# Patient Record
Sex: MALE | Race: WHITE | NOT HISPANIC OR LATINO | Employment: OTHER | URBAN - METROPOLITAN AREA
[De-identification: names, ages, dates, MRNs, and addresses within clinical notes are randomized per-mention and may not be internally consistent; named-entity substitution may affect disease eponyms.]

---

## 2021-06-30 ENCOUNTER — HISTORICAL (OUTPATIENT)
Dept: RADIOLOGY | Facility: HOSPITAL | Age: 71
End: 2021-06-30

## 2021-11-08 ENCOUNTER — HISTORICAL (OUTPATIENT)
Dept: CARDIOLOGY | Facility: HOSPITAL | Age: 71
End: 2021-11-08

## 2021-11-08 LAB
ABS NEUT (OLG): 6.11 X10(3)/MCL (ref 2.1–9.2)
BASOPHILS # BLD AUTO: 0 X10(3)/MCL (ref 0–0.2)
BASOPHILS NFR BLD AUTO: 0 %
BUN SERPL-MCNC: 31.6 MG/DL (ref 8.4–25.7)
CALCIUM SERPL-MCNC: 9.9 MG/DL (ref 8.7–10.5)
CHLORIDE SERPL-SCNC: 103 MMOL/L (ref 98–107)
CO2 SERPL-SCNC: 20 MMOL/L (ref 23–31)
CREAT SERPL-MCNC: 1.14 MG/DL (ref 0.73–1.18)
CREAT/UREA NIT SERPL: 28
ERYTHROCYTE [DISTWIDTH] IN BLOOD BY AUTOMATED COUNT: 12.7 % (ref 11.5–17)
GLUCOSE SERPL-MCNC: 312 MG/DL (ref 82–115)
HCT VFR BLD AUTO: 39.5 % (ref 42–52)
HGB BLD-MCNC: 13.7 GM/DL (ref 14–18)
LYMPHOCYTES # BLD AUTO: 1 X10(3)/MCL (ref 0.6–4.6)
LYMPHOCYTES NFR BLD AUTO: 14 %
MCH RBC QN AUTO: 31.6 PG (ref 27–31)
MCHC RBC AUTO-ENTMCNC: 34.7 GM/DL (ref 33–36)
MCV RBC AUTO: 91.2 FL (ref 80–94)
MONOCYTES # BLD AUTO: 0.2 X10(3)/MCL (ref 0.1–1.3)
MONOCYTES NFR BLD AUTO: 3 %
NEUTROPHILS # BLD AUTO: 6.11 X10(3)/MCL (ref 2.1–9.2)
NEUTROPHILS NFR BLD AUTO: 82 %
PLATELET # BLD AUTO: 258 X10(3)/MCL (ref 130–400)
PMV BLD AUTO: 9.6 FL (ref 9.4–12.4)
POTASSIUM SERPL-SCNC: 4.9 MMOL/L (ref 3.5–5.1)
RBC # BLD AUTO: 4.33 X10(6)/MCL (ref 4.7–6.1)
SODIUM SERPL-SCNC: 134 MMOL/L (ref 136–145)
WBC # SPEC AUTO: 7.4 X10(3)/MCL (ref 4.5–11.5)

## 2021-11-09 ENCOUNTER — HISTORICAL (OUTPATIENT)
Dept: RESPIRATORY THERAPY | Facility: HOSPITAL | Age: 71
End: 2021-11-09

## 2021-11-11 ENCOUNTER — HISTORICAL (OUTPATIENT)
Dept: LAB | Facility: HOSPITAL | Age: 71
End: 2021-11-11

## 2021-11-11 LAB
BUN SERPL-MCNC: 26.2 MG/DL (ref 8.4–25.7)
CALCIUM SERPL-MCNC: 10.5 MG/DL (ref 8.8–10)
CHLORIDE SERPL-SCNC: 101 MMOL/L (ref 98–107)
CO2 SERPL-SCNC: 22 MMOL/L (ref 23–31)
CREAT SERPL-MCNC: 1.11 MG/DL (ref 0.72–1.25)
CREAT/UREA NIT SERPL: 24
GLUCOSE SERPL-MCNC: 245 MG/DL (ref 82–115)
POTASSIUM SERPL-SCNC: 4.9 MMOL/L (ref 3.5–5.1)
SODIUM SERPL-SCNC: 134 MMOL/L (ref 136–145)

## 2021-11-12 ENCOUNTER — HISTORICAL (OUTPATIENT)
Dept: ADMINISTRATIVE | Facility: HOSPITAL | Age: 71
End: 2021-11-12

## 2021-12-03 ENCOUNTER — HISTORICAL (OUTPATIENT)
Dept: ADMINISTRATIVE | Facility: HOSPITAL | Age: 71
End: 2021-12-03

## 2021-12-03 LAB
BUN SERPL-MCNC: 20.1 MG/DL (ref 8.4–25.7)
CALCIUM SERPL-MCNC: 9.9 MG/DL (ref 8.7–10.5)
CHLORIDE SERPL-SCNC: 102 MMOL/L (ref 98–107)
CO2 SERPL-SCNC: 22 MMOL/L (ref 23–31)
CREAT SERPL-MCNC: 0.88 MG/DL (ref 0.73–1.18)
CREAT/UREA NIT SERPL: 23
GLUCOSE SERPL-MCNC: 207 MG/DL (ref 82–115)
POTASSIUM SERPL-SCNC: 4.6 MMOL/L (ref 3.5–5.1)
SODIUM SERPL-SCNC: 137 MMOL/L (ref 136–145)

## 2021-12-06 ENCOUNTER — HISTORICAL (OUTPATIENT)
Dept: ADMINISTRATIVE | Facility: HOSPITAL | Age: 71
End: 2021-12-06

## 2021-12-06 LAB
ABS NEUT (OLG): 4.12 X10(3)/MCL (ref 2.1–9.2)
ALBUMIN SERPL-MCNC: 4.1 GM/DL (ref 3.4–4.8)
ALBUMIN/GLOB SERPL: 1.4 RATIO (ref 1.1–2)
ALP SERPL-CCNC: 62 UNIT/L (ref 40–150)
ALT SERPL-CCNC: 46 UNIT/L (ref 0–55)
AST SERPL-CCNC: 39 UNIT/L (ref 5–34)
BASOPHILS # BLD AUTO: 0 X10(3)/MCL (ref 0–0.2)
BASOPHILS NFR BLD AUTO: 1 %
BILIRUB SERPL-MCNC: 1 MG/DL
BILIRUBIN DIRECT+TOT PNL SERPL-MCNC: 0.3 MG/DL (ref 0–0.5)
BILIRUBIN DIRECT+TOT PNL SERPL-MCNC: 0.7 MG/DL (ref 0–0.8)
BNP BLD-MCNC: 13.7 PG/ML
BUN SERPL-MCNC: 26.2 MG/DL (ref 8.4–25.7)
CALCIUM SERPL-MCNC: 9.8 MG/DL (ref 8.7–10.5)
CHLORIDE SERPL-SCNC: 100 MMOL/L (ref 98–107)
CO2 SERPL-SCNC: 24 MMOL/L (ref 23–31)
CREAT SERPL-MCNC: 1.01 MG/DL (ref 0.73–1.18)
EOSINOPHIL # BLD AUTO: 0.2 X10(3)/MCL (ref 0–0.9)
EOSINOPHIL NFR BLD AUTO: 2 %
ERYTHROCYTE [DISTWIDTH] IN BLOOD BY AUTOMATED COUNT: 13.2 % (ref 11.5–17)
GLOBULIN SER-MCNC: 3 GM/DL (ref 2.4–3.5)
GLUCOSE SERPL-MCNC: 187 MG/DL (ref 82–115)
GROUP & RH: NORMAL
HCT VFR BLD AUTO: 40.6 % (ref 42–52)
HGB BLD-MCNC: 13.6 GM/DL (ref 14–18)
INR PPP: 1 (ref 0–1.3)
LYMPHOCYTES # BLD AUTO: 2.3 X10(3)/MCL (ref 0.6–4.6)
LYMPHOCYTES NFR BLD AUTO: 32 %
MCH RBC QN AUTO: 31.4 PG (ref 27–31)
MCHC RBC AUTO-ENTMCNC: 33.5 GM/DL (ref 33–36)
MCV RBC AUTO: 93.8 FL (ref 80–94)
MONOCYTES # BLD AUTO: 0.6 X10(3)/MCL (ref 0.1–1.3)
MONOCYTES NFR BLD AUTO: 8 %
NEUTROPHILS # BLD AUTO: 4.12 X10(3)/MCL (ref 2.1–9.2)
NEUTROPHILS NFR BLD AUTO: 57 %
PLATELET # BLD AUTO: 258 X10(3)/MCL (ref 130–400)
PMV BLD AUTO: 10.3 FL (ref 9.4–12.4)
POTASSIUM SERPL-SCNC: 4.5 MMOL/L (ref 3.5–5.1)
PROT SERPL-MCNC: 7.1 GM/DL (ref 5.8–7.6)
PROTHROMBIN TIME: 13.4 SECOND(S) (ref 12.5–14.5)
RBC # BLD AUTO: 4.33 X10(6)/MCL (ref 4.7–6.1)
SODIUM SERPL-SCNC: 135 MMOL/L (ref 136–145)
WBC # SPEC AUTO: 7.2 X10(3)/MCL (ref 4.5–11.5)

## 2021-12-08 LAB — FINAL CULTURE: NORMAL

## 2022-04-10 ENCOUNTER — HISTORICAL (OUTPATIENT)
Dept: ADMINISTRATIVE | Facility: HOSPITAL | Age: 72
End: 2022-04-10

## 2022-04-26 VITALS
SYSTOLIC BLOOD PRESSURE: 121 MMHG | DIASTOLIC BLOOD PRESSURE: 65 MMHG | BODY MASS INDEX: 38.54 KG/M2 | HEIGHT: 67 IN | WEIGHT: 245.56 LBS

## 2023-08-25 ENCOUNTER — HOSPITAL ENCOUNTER (INPATIENT)
Facility: HOSPITAL | Age: 73
LOS: 3 days | Discharge: HOME | DRG: 244 | End: 2023-08-28
Attending: EMERGENCY MEDICINE | Admitting: INTERNAL MEDICINE
Payer: MEDICARE

## 2023-08-25 DIAGNOSIS — I44.2 COMPLETE HEART BLOCK: ICD-10-CM

## 2023-08-25 DIAGNOSIS — R00.1 BRADYCARDIA: ICD-10-CM

## 2023-08-25 DIAGNOSIS — I49.9 ARRHYTHMIA: ICD-10-CM

## 2023-08-25 DIAGNOSIS — I45.9 HEART BLOCK: ICD-10-CM

## 2023-08-25 DIAGNOSIS — R06.02 SOB (SHORTNESS OF BREATH): ICD-10-CM

## 2023-08-25 LAB
ALBUMIN SERPL-MCNC: 4 G/DL (ref 3.4–4.8)
ALBUMIN/GLOB SERPL: 1.1 RATIO (ref 1.1–2)
ALP SERPL-CCNC: 53 UNIT/L (ref 40–150)
ALT SERPL-CCNC: 20 UNIT/L (ref 0–55)
APTT PPP: 26.2 SECONDS (ref 23.2–33.7)
AST SERPL-CCNC: 19 UNIT/L (ref 5–34)
AV INDEX (PROSTH): 0.49
AV MEAN GRADIENT: 9 MMHG
AV PEAK GRADIENT: 19 MMHG
AV VALVE AREA BY VELOCITY RATIO: 1.34 CM²
AV VALVE AREA: 1.4 CM²
AV VELOCITY RATIO: 0.47
BASOPHILS # BLD AUTO: 0.05 X10(3)/MCL
BASOPHILS NFR BLD AUTO: 0.5 %
BILIRUB SERPL-MCNC: 1.1 MG/DL
BNP BLD-MCNC: 211.3 PG/ML
BSA FOR ECHO PROCEDURE: 2.27 M2
BUN SERPL-MCNC: 25.8 MG/DL (ref 8.4–25.7)
CALCIUM SERPL-MCNC: 10.6 MG/DL (ref 8.8–10)
CHLORIDE SERPL-SCNC: 105 MMOL/L (ref 98–107)
CO2 SERPL-SCNC: 20 MMOL/L (ref 23–31)
CREAT SERPL-MCNC: 1.19 MG/DL (ref 0.73–1.18)
CV ECHO LV RWT: 0.49 CM
DOP CALC AO PEAK VEL: 2.18 M/S
DOP CALC AO VTI: 43.9 CM
DOP CALC LVOT AREA: 2.8 CM2
DOP CALC LVOT DIAMETER: 1.9 CM
DOP CALC LVOT PEAK VEL: 1.03 M/S
DOP CALC LVOT STROKE VOLUME: 61.49 CM3
DOP CALC MV VTI: 73.9 CM
DOP CALCLVOT PEAK VEL VTI: 21.7 CM
E WAVE DECELERATION TIME: 306 MSEC
E/A RATIO: 4.48
E/E' RATIO: 25.57 M/S
ECHO LV POSTERIOR WALL: 1.2 CM (ref 0.6–1.1)
EOSINOPHIL # BLD AUTO: 0.16 X10(3)/MCL (ref 0–0.9)
EOSINOPHIL NFR BLD AUTO: 1.7 %
ERYTHROCYTE [DISTWIDTH] IN BLOOD BY AUTOMATED COUNT: 13.5 % (ref 11.5–17)
FRACTIONAL SHORTENING: 39 % (ref 28–44)
GFR SERPLBLD CREATININE-BSD FMLA CKD-EPI: >60 MLS/MIN/1.73/M2
GLOBULIN SER-MCNC: 3.5 GM/DL (ref 2.4–3.5)
GLUCOSE SERPL-MCNC: 104 MG/DL (ref 82–115)
HCT VFR BLD AUTO: 32.4 % (ref 42–52)
HGB BLD-MCNC: 11.7 G/DL (ref 14–18)
IMM GRANULOCYTES # BLD AUTO: 0.03 X10(3)/MCL (ref 0–0.04)
IMM GRANULOCYTES NFR BLD AUTO: 0.3 %
INR PPP: 1.1
INTERVENTRICULAR SEPTUM: 1.26 CM (ref 0.6–1.1)
LEFT ATRIUM SIZE: 4 CM
LEFT ATRIUM VOLUME INDEX MOD: 25.3 ML/M2
LEFT ATRIUM VOLUME MOD: 55.3 CM3
LEFT INTERNAL DIMENSION IN SYSTOLE: 2.99 CM (ref 2.1–4)
LEFT VENTRICLE DIASTOLIC VOLUME INDEX: 50.68 ML/M2
LEFT VENTRICLE DIASTOLIC VOLUME: 111 ML
LEFT VENTRICLE MASS INDEX: 106 G/M2
LEFT VENTRICLE SYSTOLIC VOLUME INDEX: 15.8 ML/M2
LEFT VENTRICLE SYSTOLIC VOLUME: 34.7 ML
LEFT VENTRICULAR INTERNAL DIMENSION IN DIASTOLE: 4.87 CM (ref 3.5–6)
LEFT VENTRICULAR MASS: 232.17 G
LV LATERAL E/E' RATIO: 29.83 M/S
LV SEPTAL E/E' RATIO: 22.38 M/S
LVOT MG: 2 MMHG
LVOT MV: 0.64 CM/S
LYMPHOCYTES # BLD AUTO: 2.7 X10(3)/MCL (ref 0.6–4.6)
LYMPHOCYTES NFR BLD AUTO: 29.2 %
MAGNESIUM SERPL-MCNC: 1.9 MG/DL (ref 1.6–2.6)
MCH RBC QN AUTO: 32 PG (ref 27–31)
MCHC RBC AUTO-ENTMCNC: 36.1 G/DL (ref 33–36)
MCV RBC AUTO: 88.5 FL (ref 80–94)
MONOCYTES # BLD AUTO: 0.83 X10(3)/MCL (ref 0.1–1.3)
MONOCYTES NFR BLD AUTO: 9 %
MV MEAN GRADIENT: 4 MMHG
MV PEAK A VEL: 0.4 M/S
MV PEAK E VEL: 1.79 M/S
MV PEAK GRADIENT: 20 MMHG
MV STENOSIS PRESSURE HALF TIME: 69 MS
MV VALVE AREA BY CONTINUITY EQUATION: 0.83 CM2
MV VALVE AREA P 1/2 METHOD: 3.19 CM2
NEUTROPHILS # BLD AUTO: 5.48 X10(3)/MCL (ref 2.1–9.2)
NEUTROPHILS NFR BLD AUTO: 59.3 %
NRBC BLD AUTO-RTO: 0 %
OHS LV EJECTION FRACTION SIMPSONS BIPLANE MOD: 68 %
PISA TR MAX VEL: 2.48 M/S
PLATELET # BLD AUTO: 211 X10(3)/MCL (ref 130–400)
PMV BLD AUTO: 9.9 FL (ref 7.4–10.4)
POCT GLUCOSE: 73 MG/DL (ref 70–110)
POCT GLUCOSE: 93 MG/DL (ref 70–110)
POTASSIUM SERPL-SCNC: 4.6 MMOL/L (ref 3.5–5.1)
PROT SERPL-MCNC: 7.5 GM/DL (ref 5.8–7.6)
PROTHROMBIN TIME: 13.9 SECONDS (ref 12.5–14.5)
PV PEAK GRADIENT: 5 MMHG
PV PEAK VELOCITY: 1.07 M/S
RA PRESSURE ESTIMATED: 8 MMHG
RBC # BLD AUTO: 3.66 X10(6)/MCL (ref 4.7–6.1)
RV TB RVSP: 10 MMHG
SODIUM SERPL-SCNC: 136 MMOL/L (ref 136–145)
TDI LATERAL: 0.06 M/S
TDI SEPTAL: 0.08 M/S
TDI: 0.07 M/S
TR MAX PG: 25 MMHG
TRICUSPID ANNULAR PLANE SYSTOLIC EXCURSION: 2.44 CM
TROPONIN I SERPL-MCNC: <0.01 NG/ML (ref 0–0.04)
TV REST PULMONARY ARTERY PRESSURE: 33 MMHG
WBC # SPEC AUTO: 9.25 X10(3)/MCL (ref 4.5–11.5)
Z-SCORE OF LEFT VENTRICULAR DIMENSION IN END DIASTOLE: -4.18
Z-SCORE OF LEFT VENTRICULAR DIMENSION IN END SYSTOLE: -3.23

## 2023-08-25 PROCEDURE — 93010 ELECTROCARDIOGRAM REPORT: CPT | Mod: ,,, | Performed by: INTERNAL MEDICINE

## 2023-08-25 PROCEDURE — 25000003 PHARM REV CODE 250

## 2023-08-25 PROCEDURE — 83735 ASSAY OF MAGNESIUM: CPT | Performed by: EMERGENCY MEDICINE

## 2023-08-25 PROCEDURE — 83880 ASSAY OF NATRIURETIC PEPTIDE: CPT | Performed by: NURSE PRACTITIONER

## 2023-08-25 PROCEDURE — 11000001 HC ACUTE MED/SURG PRIVATE ROOM

## 2023-08-25 PROCEDURE — 85610 PROTHROMBIN TIME: CPT | Performed by: EMERGENCY MEDICINE

## 2023-08-25 PROCEDURE — 85730 THROMBOPLASTIN TIME PARTIAL: CPT | Performed by: EMERGENCY MEDICINE

## 2023-08-25 PROCEDURE — 99285 EMERGENCY DEPT VISIT HI MDM: CPT | Mod: 25

## 2023-08-25 PROCEDURE — 84484 ASSAY OF TROPONIN QUANT: CPT | Performed by: NURSE PRACTITIONER

## 2023-08-25 PROCEDURE — 85025 COMPLETE CBC W/AUTO DIFF WBC: CPT | Performed by: NURSE PRACTITIONER

## 2023-08-25 PROCEDURE — 80053 COMPREHEN METABOLIC PANEL: CPT | Performed by: NURSE PRACTITIONER

## 2023-08-25 PROCEDURE — 93010 EKG 12-LEAD: ICD-10-PCS | Mod: ,,, | Performed by: INTERNAL MEDICINE

## 2023-08-25 PROCEDURE — 93005 ELECTROCARDIOGRAM TRACING: CPT

## 2023-08-25 PROCEDURE — 82962 GLUCOSE BLOOD TEST: CPT

## 2023-08-25 PROCEDURE — 21400001 HC TELEMETRY ROOM

## 2023-08-25 RX ORDER — GLIPIZIDE 10 MG/1
10 TABLET ORAL
COMMUNITY

## 2023-08-25 RX ORDER — LISINOPRIL 20 MG/1
20 TABLET ORAL DAILY
COMMUNITY

## 2023-08-25 RX ORDER — LISINOPRIL AND HYDROCHLOROTHIAZIDE 12.5; 2 MG/1; MG/1
1 TABLET ORAL DAILY
COMMUNITY

## 2023-08-25 RX ORDER — ROSUVASTATIN CALCIUM 40 MG/1
10 TABLET, COATED ORAL NIGHTLY
COMMUNITY

## 2023-08-25 RX ORDER — METFORMIN HYDROCHLORIDE 500 MG/1
1000 TABLET ORAL 2 TIMES DAILY WITH MEALS
Status: DISCONTINUED | OUTPATIENT
Start: 2023-08-25 | End: 2023-08-28 | Stop reason: HOSPADM

## 2023-08-25 RX ORDER — ATROPINE SULFATE 0.1 MG/ML
0.5 INJECTION INTRAVENOUS ONCE AS NEEDED
Status: DISCONTINUED | OUTPATIENT
Start: 2023-08-25 | End: 2023-08-28 | Stop reason: HOSPADM

## 2023-08-25 RX ORDER — ASPIRIN 81 MG/1
81 TABLET ORAL DAILY
Status: DISCONTINUED | OUTPATIENT
Start: 2023-08-26 | End: 2023-08-28 | Stop reason: HOSPADM

## 2023-08-25 RX ORDER — ALBUTEROL SULFATE 90 UG/1
2 AEROSOL, METERED RESPIRATORY (INHALATION) EVERY 6 HOURS PRN
COMMUNITY

## 2023-08-25 RX ORDER — METFORMIN HYDROCHLORIDE 1000 MG/1
1000 TABLET ORAL 2 TIMES DAILY WITH MEALS
COMMUNITY

## 2023-08-25 RX ORDER — ATORVASTATIN CALCIUM 40 MG/1
40 TABLET, FILM COATED ORAL DAILY
Status: DISCONTINUED | OUTPATIENT
Start: 2023-08-26 | End: 2023-08-28 | Stop reason: HOSPADM

## 2023-08-25 RX ORDER — GLIPIZIDE 10 MG/1
10 TABLET ORAL 2 TIMES DAILY WITH MEALS
Status: DISCONTINUED | OUTPATIENT
Start: 2023-08-25 | End: 2023-08-28 | Stop reason: HOSPADM

## 2023-08-25 RX ADMIN — METFORMIN HYDROCHLORIDE 1000 MG: 500 TABLET, FILM COATED ORAL at 07:08

## 2023-08-25 RX ADMIN — GLIPIZIDE 10 MG: 10 TABLET ORAL at 07:08

## 2023-08-25 NOTE — Clinical Note
The lead was inserted under fluorscopic guidance, thresholds were tested, was sutured in place and was secured in place. A new lead was attached to the right atrium.

## 2023-08-25 NOTE — Clinical Note
The lead was inserted under fluorscopic guidance, thresholds were tested, was sutured in place and was secured in place. A new lead was attached to the right ventricle.

## 2023-08-25 NOTE — PLAN OF CARE
Pt is , 2 children, no living will or POA.   08/25/23 1505   Discharge Assessment   Assessment Type Discharge Planning Assessment   Confirmed/corrected address, phone number and insurance Yes   Confirmed Demographics Correct on Facesheet   Source of Information patient   When was your last doctors appointment?   (Montserrat Taylor PCP at [facility]- last appt today)   Communicated LV with patient/caregiver Date not available/Unable to determine   People in Home spouse   Do you expect to return to your current living situation? Yes   Do you have help at home or someone to help you manage your care at home? Yes   Who are your caregiver(s) and their phone number(s)? wife Andra [phone]   Prior to hospitilization cognitive status: Unable to Assess   Current cognitive status: Alert/Oriented   Walking or Climbing Stairs   (none)   Dressing/Bathing   (none)   Home Accessibility wheelchair accessible   Home Layout Able to live on 1st floor   Equipment Currently Used at Home blood pressure machine;glucometer   Readmission within 30 days? No   Patient currently being followed by outpatient case management? No   Do you currently have service(s) that help you manage your care at home? No   Do you take prescription medications? Yes  (Fills with [facility])   Do you have prescription coverage? Yes   Coverage [employer]   Do you have any problems affording any of your prescribed medications? No   Is the patient taking medications as prescribed? yes   Who is going to help you get home at discharge? brother in law   How do you get to doctors appointments? family or friend will provide;car, drives self   Are you on dialysis? No   Do you take coumadin? No   DME Needed Upon Discharge  other (see comments)  (TBD)   Discharge Plan discussed with: Patient   Transition of Care Barriers None   Discharge Plan A Other  (TBD)

## 2023-08-25 NOTE — FIRST PROVIDER EVALUATION
Medical screening examination initiated.  I have conducted a focused provider triage encounter, findings are as follows:    Brief history of present illness:  Patient states that he went to his PCP's office this morning for SOB. States that he was told that his heart rate was low and to report to the ED.     There were no vitals filed for this visit.    Pertinent physical exam:  Awake, alert, ambulatory      Brief workup plan:  Labs, EKG, Imaging    Preliminary workup initiated; this workup will be continued and followed by the physician or advanced practice provider that is assigned to the patient when roomed.

## 2023-08-25 NOTE — H&P
[facility] - Emergency Dept    Cardiology  History and Physical     Patient Name: Kendall Conroy Sr.  MRN: [id number]  Admission Date: 8/25/2023  Code Status: No Order   Attending Provider: Oswald Christine MD   Primary Care Physician: Montserrat Taylor FNP  Principal Problem:<principal problem not specified>    Patient information was obtained from patient, past medical records, and ER records.     Subjective:     Chief Complaint:  Shortness of Breath    HPI:  Mr. Conroy is a 71 y/o male with a history of Bradycardia, AS s/p TAVR, Bilateral Carotid Artery Disease, Native CAD, DM II, HTN, who is known to [facility], Dr. Vallejo. He presented to the ER on 8.25.23 with complaints of SOB. He reports he went to his PCP office for the SOB and they reported his heart rate was low. He was then instructed to report to the ER. Upon Arrival to the ER, he was found to be bradycardic with a heart rate in the 40s. EKG revealed Complete Heart Block. Significant labs include H&H 11.7/32.4, BUN/Creat 25.8/1.119, Calcium 10.6, .3. CXR unremarkable. [facility] will admit for Complete Heart Block Management.    PMH: Bradycardia, VHD, Bilateral Carotid Artery Disease, Native CAD, DM II, HTN, Hypersomia, Snoring  PSH: TAVR, Carpal Tunnel Repair, Neck Surgery, Back Surgery  Family History: Father - HTN; Mother - Encephalitis  Social History: Former Smoker (2003). Denies illicit drug use and alcohol use.     Previous Cardiac Diagnostics:   ECHO (8.25.23):   Left Ventricle: Mildly increased wall thickness. Normal wall motion. There is normal systolic function with a visually estimated ejection fraction of 65 - 70%. Right Ventricle: Normal right ventricular cavity size. Systolic function is normal. Aortic Valve: The aortic valve is a trileaflet valve. There is mild stenosis. Aortic valve area by VTI is 1.40 cm². Aortic valve peak velocity is 2.18 m/s. Mean gradient is 9 mmHg. The dimensionless index is 0.49. There is no  significant regurgitation.  Mitral Valve: Mildly thickened leaflets. Mildly calcified leaflets. There is no stenosis. The mean pressure gradient across the mitral valve is 4 mmHg at a heart rate of  bpm. There is mild regurgitation. Tricuspid Valve: There is mild regurgitation. IVC/SVC: Intermediate venous pressure at 8 mmHg.    ETT (3.30.23):  Stress EKG is abnormal. Nugent treadmill score is -2, this is suggestive of moderate risk treadmill test. Horizontal ST depression is noted in the Lead 2, Lead 3, AVF, V3, V4, V5 and V6. Maximal exercise treadmill test (MPHR : 86 %).The functional capacity is fair (5.0 METs).The heart rate recovery is normal. The study quality is good.     Holter Monitor (3.17.23):  This is an average quality study. Predominant rhythm is sinus bradycardia. The minimum heart rate recorded was 38 beats / minute (3/17/2023). The maximum heart rate is 96 beats / minute (3/17/2023). The mean heart rate is 43 beats / minute. Second degree AV block noted. 1st degree AV block with rare second degree Type 1 AV block during awake hours. Rare premature ventricular contractions noted.     Carotid US (11.30.22):  The study quality is average. 1-39% stenosis in the proximal right internal carotid artery based on Bluth Criteria. The right vertebral artery is poorly visualized. 1-39% stenosis in the proximal left internal carotid artery based on Bluth Criteria. Antegrade left vertebral artery flow.     TTE (11.30.22):  The study quality is average. The left ventricle is normal in size. Global left ventricular systolic function is normal. The left ventricular ejection fraction is 60%. Left ventricular diastolic function is normal. A prosthetic valve is noted in the aortic position it is a known TAVR 29mm S3 valve date of implant 17/7/2021. The trans-aortic mean gradient is 9.9 mmHg.    TAVR (12.7.23):  Successful transcatheter valve replacement 29 mm S3 valve    LHC (11.8.21):  Left main coronary artery: Short.  Patent.  Left anterior descending artery: Luminal irregularities  Left circumflex: Dominant. Luminal regularities.  Right coronary artery: Unable to cannulate.    MPI (11.22.19):  This is an abnormal perfusion study. Study is consistent with ischemia.This scan is suggestive of low to moderate risk for future cardiovascular events. Small reversible perfusion abnormality of mild intensity in the anterior lateral region. The left ventricular cavity is noted to be normal on the stress study. The left ventricular ejection fraction was calculated to be 62% and left ventricular global function is normal. The study quality is good        Review of patient's allergies indicates:   Allergen Reactions    Iodine Anaphylaxis    Gabapentin      Other reaction(s): Elevated liver enzymes    Shrimp      Other reaction(s): Anaphalaxis       No current facility-administered medications on file prior to encounter.     No current outpatient medications on file prior to encounter.     Family History    None       Tobacco Use    Smoking status: Not on file    Smokeless tobacco: Not on file   Substance and Sexual Activity    Alcohol use: Not on file    Drug use: Not on file    Sexual activity: Not on file     Review of Systems   Cardiovascular:  Positive for dyspnea on exertion. Negative for chest pain, leg swelling and palpitations.   Respiratory:  Positive for shortness of breath.    All other systems reviewed and are negative.    Objective:     Vital Signs (Most Recent):  Temp: 98 °F (36.7 °C) (08/25/23 1059)  Pulse: (!) 40 (08/25/23 1334)  Resp: 18 (08/25/23 1334)  BP: (!) 144/54 (08/25/23 1334)  SpO2: 96 % (08/25/23 1334) Vital Signs (24h Range):  Temp:  [98 °F (36.7 °C)] 98 °F (36.7 °C)  Pulse:  [40-42] 40  Resp:  [15-22] 18  SpO2:  [96 %-98 %] 96 %  BP: (106-148)/() 144/54     Weight: 108.9 kg (240 lb 1.3 oz)  Body mass index is 37.6 kg/m².    SpO2: 96 %       No intake or output data in the 24 hours ending 08/25/23  1525    Lines/Drains/Airways       Peripheral Intravenous Line  Duration                  Peripheral IV - Single Lumen 08/25/23 1113 20 G Anterior;Left Forearm <1 day                    Physical Exam  Vitals and nursing note reviewed.   Constitutional:       Appearance: Normal appearance. He is obese.   HENT:      Head: Normocephalic.      Nose: Nose normal.      Mouth/Throat:      Mouth: Mucous membranes are moist.   Eyes:      Pupils: Pupils are equal, round, and reactive to light.   Cardiovascular:      Rate and Rhythm: Bradycardia present. Rhythm irregular.      Pulses: Normal pulses.      Heart sounds: Normal heart sounds.   Pulmonary:      Effort: Pulmonary effort is normal.      Breath sounds: Normal breath sounds.   Abdominal:      General: Bowel sounds are normal.      Palpations: Abdomen is soft.   Musculoskeletal:         General: Normal range of motion.      Cervical back: Normal range of motion.   Skin:     General: Skin is warm.      Capillary Refill: Capillary refill takes less than 2 seconds.   Neurological:      Mental Status: He is alert and oriented to person, place, and time.   Psychiatric:         Mood and Affect: Mood normal.         Behavior: Behavior normal.       EKG:       Telemetry: Complete Heart Block    Significant Labs:   Recent Lab Results         08/25/23  1308   08/25/23  1245   08/25/23  1113        Albumin/Globulin Ratio     1.1       Albumin     4.0       Alkaline Phosphatase     53       ALT     20       Ao peak jeffrey 2.18           Ao VTI 43.90           aPTT   26.2  Comment: For Minimal Heparin Infusion, the goal aPTT 64-85 seconds corresponds to an anti-Xa of 0.3-0.5.    For Low Intensity and High Intensity Heparin, the goal aPTT  seconds corresponds to an anti-Xa of 0.3-0.7         AST     19       AV valve area 1.40           ROLO by Velocity Ratio 1.34           AV mean gradient 9           AV index (prosthetic) 0.49           AV peak gradient 19           AV Velocity  Ratio 0.47           Baso #     0.05       Basophil %     0.5       BILIRUBIN TOTAL     1.1       BNP     211.3       BSA 2.27           BUN     25.8       Calcium     10.6       Chloride     105       CO2     20       Creatinine     1.19       Left Ventricle Relative Wall Thickness 0.49           E/A ratio 4.48           E/E' ratio 25.57           eGFR     >60       Eos #     0.16       Eosinophil %     1.7       E wave deceleration time 306.00           FS 39           Globulin, Total     3.5       Glucose     104       Hematocrit     32.4       Hemoglobin     11.7       Immature Grans (Abs)     0.03       Immature Granulocytes     0.3       INR   1.1         IVSd 1.26           LA size 4.00           LA volume 55.30           LA Volume Index (Mod) 25.3           LVOT area 2.8           LV LATERAL E/E' RATIO 29.83           LV SEPTAL E/E' RATIO 22.38           LV EDV .00           LV Diastolic Volume Index 50.68           LVIDd 4.87           LVIDs 2.99           LV mass 232.17           LV Mass Index 106           Left Ventricular Outflow Tract Mean Gradient 2.00           Left Ventricular Outflow Tract Mean Velocity 0.64           LVOT diameter 1.90           LVOT peak siddhartha 1.03           LVOT stroke volume 61.49           LVOT peak VTI 21.70           LV ESV BP 34.70           LV Systolic Volume Index 15.8           Lymph #     2.70       LYMPH %     29.2       Magnesium     1.90       MCH     32.0       MCHC     36.1       MCV     88.5       Mean e' 0.07           Mono #     0.83       Mono %     9.0       MPV     9.9       MV valve area p 1/2 method 3.19           MV valve area by continuity eq 0.83           MV mean gradient 4           MV peak gradient 20           MV Peak A Siddhartha 0.40           MV Peak E Siddhartha 1.79           MV stenosis pressure 1/2 time 69.00           MV VTI 73.9           Neut #     5.48       Neut %     59.3       nRBC     0.0       Gallegos's Biplane MOD Ejection Fraction 68            Platelets     211       Potassium     4.6       PROTEIN TOTAL     7.5       Protime   13.9         PV peak gradient 5           PV PEAK VELOCITY 1.07           Posterior Wall 1.20           Est. RA pres 8           RBC     3.66       RDW     13.5       RV TB RVSP 10           Sodium     136       TAPSE 2.44           TDI SEPTAL 0.08           TDI LATERAL 0.06           Triscuspid Valve Regurgitation Peak Gradient 25           TR Max Siddhartha 2.48           Troponin I     <0.010       TV resting pulmonary artery pressure 33           WBC     9.25       ZLVIDD -4.18           ZLVIDS -3.23                   Significant Imaging: X-Ray: CXR: X-Ray Chest 1 View (CXR):   Results for orders placed or performed during the hospital encounter of 08/25/23   X-Ray Chest 1 View    Narrative    EXAMINATION:  XR CHEST 1 VIEW    CLINICAL HISTORY:  Shortness of breath    TECHNIQUE:  One view    COMPARISON:  December 6, 2021.    FINDINGS:  Cardiopericardial silhouette is within normal limits. Lungs are without dense focal or segmental consolidation, congestive process, pleural effusions or pneumothorax.      Impression    NO ACUTE CARDIOPULMONARY PROCESS IDENTIFIED.      Electronically signed by: Don Lilly  Date:    08/25/2023  Time:    11:25       VTE Risk Mitigation (From admission, onward)      None          Assessment:   Complete Heart Block  --Currently Stable   VHD  --AS s/p TAVR (12.7.21):Successful transcatheter valve replacement 29 mm S3 valve  Bilateral Carotid Artery Disease  Native CAD  --LHC (11.8.21):  Left main coronary artery: Short. Patent.  Left anterior descending artery: Luminal irregularities  Left circumflex: Dominant. Luminal regularities.  Right coronary artery: Unable to cannulate.  DM II  HTN  Hypersomia  Snoring  No History of GI Bleed   *Iodine Allergy*    Plan:  ECHO and EKG Reviewed  Resume Home Medications  Hold BP medications for Now  Avoid AV/SA Yeison Blocking Agents  Atropine 0.5mg IV PRN for Sustained  HR < 40 WITH BP less than 90/60  --if needs more than one dose, please call immediately   Plan for PPM on 8.28.23 unless he becomes unstable throughout the weekend  Labs in AM: CBC, CMP, and Mag     BRAIN Renae  Cardiology  [facility] - Emergency Dept  08/25/2023 3:25 PM    I have seen the patient, reviewed the Nurse Practitioner's note, assessment and plan. I have personally interviewed and examined the patient at bedside and agree with the findings. Medical decision making listed above were done under my guidance.    Physical exam:  Cardiovascular system: regular rhythm, no murmur.  Lungs: CTAB.  Extremities: No leg edema.    Plan:  Pacemaker Monday  Currently stable

## 2023-08-25 NOTE — Clinical Note
The patient's elbows and knees were padded, heels floated, warming blanket given, and safety strap applied. awake/alert/oriented to person, place, time/situation

## 2023-08-25 NOTE — ED PROVIDER NOTES
Encounter Date: 8/25/2023    SCRIBE #1 NOTE: I, Sidney Jasso, am scribing for, and in the presence of,  Dr. Ramesh. I have scribed the following portions of the note - Other sections scribed: HPI, ROS, Physical Exam, MDM, Attending.       History     Chief Complaint   Patient presents with    Shortness of Breath     POV, ambulatory, GCS 15. Reports was at [facility] this AM and had HR in 30s. Reports SOB if walks half a block. Dr Chau cardiologist. Denies weakness, CP, dizziness.     73 y/o male with history of valve replacement presents to ED for SOB with exertion onset 1-2 months ago.  Pt states he cannot walk more than half a block without becoming short of breath.  He was seen at [facility] this morning and had heart rate in 40s, so he was sent here.  Pt denies leg swelling, chest pain, lightheadedness, dizziness or syncope.  His cardiologist is Dr. Chau.    The history is provided by the patient.   Shortness of Breath  This is a new problem. The problem occurs continuously.Episode onset: 1-2 months ago. The problem has not changed since onset.Pertinent negatives include no chest pain, no syncope and no leg swelling.     Review of patient's allergies indicates:   Allergen Reactions    Iodine Anaphylaxis    Gabapentin      Other reaction(s): Elevated liver enzymes    Shrimp      Other reaction(s): Anaphalaxis     No past medical history on file.  No past surgical history on file.  No family history on file.     Review of Systems   Respiratory:  Positive for shortness of breath.    Cardiovascular:  Negative for chest pain, leg swelling and syncope.   Neurological:  Negative for dizziness, syncope and light-headedness.       Physical Exam     Initial Vitals   BP Pulse Resp Temp SpO2   08/25/23 1059 08/25/23 1059 08/25/23 1059 08/25/23 1059 08/25/23 1101   (!) 148/54 (!) 42 (!) 22 98 °F (36.7 °C) 98 %      MAP       --                Physical Exam    Nursing note and vitals reviewed.  Constitutional:  No distress.   HENT:   Head: Normocephalic and atraumatic.   Mouth/Throat: Oropharynx is clear and moist.   Eyes: Conjunctivae are normal.   Neck: Neck supple.   Normal range of motion.  Cardiovascular:  Regular rhythm.           No murmur heard.  Bradycardic    Pulmonary/Chest: Breath sounds normal. No respiratory distress. He exhibits no tenderness.   Abdominal: Abdomen is soft. Bowel sounds are normal. He exhibits no distension. There is no abdominal tenderness.   Musculoskeletal:         General: Edema (trace LE) present. Normal range of motion.      Cervical back: Normal range of motion and neck supple.      Lumbar back: Normal. No tenderness. Normal range of motion.     Neurological: He is alert and oriented to person, place, and time. He has normal strength. No cranial nerve deficit or sensory deficit.   Skin: Skin is warm and dry.         ED Course   Procedures  Labs Reviewed   COMPREHENSIVE METABOLIC PANEL - Abnormal; Notable for the following components:       Result Value    Carbon Dioxide 20 (*)     Blood Urea Nitrogen 25.8 (*)     Creatinine 1.19 (*)     Calcium Level Total 10.6 (*)     All other components within normal limits   B-TYPE NATRIURETIC PEPTIDE - Abnormal; Notable for the following components:    Natriuretic Peptide 211.3 (*)     All other components within normal limits   CBC WITH DIFFERENTIAL - Abnormal; Notable for the following components:    RBC 3.66 (*)     Hgb 11.7 (*)     Hct 32.4 (*)     MCH 32.0 (*)     MCHC 36.1 (*)     All other components within normal limits   TROPONIN I - Normal   CBC W/ AUTO DIFFERENTIAL    Narrative:     The following orders were created for panel order CBC Auto Differential.  Procedure                               Abnormality         Status                     ---------                               -----------         ------                     CBC with Differential[[id number]]        Abnormal            Final result                 Please view results for  these tests on the individual orders.   MAGNESIUM   PROTIME-INR   APTT     EKG Readings: (Independently Interpreted)   Initial Reading: No STEMI. Rhythm: Sinus Bradycardia. Heart Rate: 42. Conduction: 3rd Degree AV Block. [place]: Normal. Clinical Impression: AV Block - 3rd Degree   08/25/2023 @ 1101       Imaging Results              X-Ray Chest 1 View (Final result)  Result time 08/25/23 11:25:52      Final result by Don Lilly MD (08/25/23 11:25:52)                   Impression:      NO ACUTE CARDIOPULMONARY PROCESS IDENTIFIED.      Electronically signed by: Don Lilly  Date:    08/25/2023  Time:    11:25               Narrative:    EXAMINATION:  XR CHEST 1 VIEW    CLINICAL HISTORY:  Shortness of breath    TECHNIQUE:  One view    COMPARISON:  December 6, 2021.    FINDINGS:  Cardiopericardial silhouette is within normal limits. Lungs are without dense focal or segmental consolidation, congestive process, pleural effusions or pneumothorax.                                       Medications - No data to display  Medical Decision Making  Problems Addressed:  Complete heart block: acute illness or injury  SOB (shortness of breath): acute illness or injury    Amount and/or Complexity of Data Reviewed  Labs: ordered.    Risk  Decision regarding hospitalization.      ED assessment:    Mr. Conroy presented w/ bradycardia noted at clinic visit today, worsening exertional dyspnea. BP stable. No associated chest pain, no syncopal episodes.     Differential diagnosis (including but not limited to):   Arrhythmia, medication effect, sick sinus syndrome, electrolyte derangements, CAD, MARIANA    ED management: EKG w/ complete heart block. Labs unremarkable. CXR clear. No AV simi blockers in home med list. Discussed with [facility] who agrees w/ admission, NPO.     My independent radiology interpretation:   CXR: no focal infiltrate or effusion    Amount and/or Complexity of Data Reviewed  Independent historian: none   Summary of  history:   External data reviewed: notes from clinic visits and prescription medications   Summary of data reviewed: seen in clinic today, noted bradycardic, no bb or ccb or other AV simi blockers on home med list  Risk and benefits of testing: discussed   Labs: ordered and reviewed  Radiology: ordered and independent interpretation performed (see above or ED course)  ECG/medicine tests: ordered and independent interpretation performed (see above or ED course)  Discussion of management or test interpretation with external provider(s): discussed with cardiology consultant   Summary of discussion: as below    Risk  Decision regarding hospitalization  Shared decision making     Critical Care  none    I, Yasmine Ramesh MD personally performed the history, PE, MDM, and procedures as documented above and agree with the scribe's documentation.           Scribe Attestation:   Scribe #1: I performed the above scribed service and the documentation accurately describes the services I performed. I attest to the accuracy of the note.    Attending Attestation:           Physician Attestation for Scribe:  Physician Attestation Statement for Scribe #1: I, reviewed documentation, as scribed by Sidney Jasso in my presence, and it is both accurate and complete.             ED Course as of 08/25/23 1240   Fri Aug 25, 2023   1238 Case discussed with MADDIE Amaro with [facility].  Agrees with admission.  Requests that the patient be kept NPO for now. [KS]      ED Course User Index  [KS] Yasmine Ramesh MD                    Clinical Impression:   Final diagnoses:  [R06.02] SOB (shortness of breath)  [I45.9] Heart block  [I44.2] Complete heart block        ED Disposition Condition    Admit                 Yasmine Ramesh MD  09/15/23 [id number]

## 2023-08-26 LAB — POCT GLUCOSE: 129 MG/DL (ref 70–110)

## 2023-08-26 PROCEDURE — 21400001 HC TELEMETRY ROOM

## 2023-08-26 PROCEDURE — 25000003 PHARM REV CODE 250

## 2023-08-26 PROCEDURE — 11000001 HC ACUTE MED/SURG PRIVATE ROOM

## 2023-08-26 RX ADMIN — METFORMIN HYDROCHLORIDE 1000 MG: 500 TABLET, FILM COATED ORAL at 08:08

## 2023-08-26 RX ADMIN — ASPIRIN 81 MG: 81 TABLET, COATED ORAL at 08:08

## 2023-08-26 RX ADMIN — GLIPIZIDE 10 MG: 10 TABLET ORAL at 08:08

## 2023-08-26 RX ADMIN — METFORMIN HYDROCHLORIDE 1000 MG: 500 TABLET, FILM COATED ORAL at 05:08

## 2023-08-26 RX ADMIN — ATORVASTATIN CALCIUM 40 MG: 40 TABLET, FILM COATED ORAL at 08:08

## 2023-08-26 NOTE — NURSING
Patient resting quietly in bed with eyes closed. Respirations even and unlabored. Safety measures in place. No overt distress observed.

## 2023-08-26 NOTE — NURSING
Received SBAR phone report from KIAN Costello in ED. Discussed HX, DX, POC and activity. Reviewed Vitals, Tele and new meds. Awaiting transport to room# 957.

## 2023-08-26 NOTE — NURSING
Nurses Note -- 4 Eyes      8/26/2023   21:30 PM      Skin assessed during: Admit      [x] No Altered Skin Integrity Present    []Prevention Measures Documented      [] Yes- Altered Skin Integrity Present or Discovered   [] LDA Added if Not in Epic (Describe Wound)   [] New Altered Skin Integrity was Present on Admit and Documented in LDA   [] Wound Image Taken    Wound Care Consulted? No    Attending Nurse:  Al Olivarez RN    Second RN/Staff Member:   William Rivera RN

## 2023-08-26 NOTE — PROGRESS NOTES
[facility] - [facility] Medical Telemetry    Cardiology  Progress Note    Patient Name: Kendall Conroy Sr.  MRN: [id number]  Admission Date: 8/25/2023  Hospital Length of Stay: 1 days  Code Status: No Order   Attending Physician: Oswald Christine MD   Primary Care Physician: Montserrat Taylor FNP  Expected Discharge Date:   Principal Problem:<principal problem not specified>    Subjective:   Chief Complaint:  Shortness of Breath     HPI:  Mr. Conroy is a 71 y/o male with a history of Bradycardia, AS s/p TAVR, Bilateral Carotid Artery Disease, Native CAD, DM II, HTN, who is known to [facility], Dr. Vallejo. He presented to the ER on 8.25.23 with complaints of SOB. He reports he went to his PCP office for the SOB and they reported his heart rate was low. He was then instructed to report to the ER. Upon Arrival to the ER, he was found to be bradycardic with a heart rate in the 40s. EKG revealed Complete Heart Block. Significant labs include H&H 11.7/32.4, BUN/Creat 25.8/1.119, Calcium 10.6, .3. CXR unremarkable. [facility] will admit for Complete Heart Block Management.    Hospital Course:   8.26.23: NAD Noted. Remains in Complete Heart Block. BP Stable. Planning for device on Monday.     PMH: Bradycardia, VHD, Bilateral Carotid Artery Disease, Native CAD, DM II, HTN, Hypersomia, Snoring  PSH: TAVR, Carpal Tunnel Repair, Neck Surgery, Back Surgery  Family History: Father - HTN; Mother - Encephalitis  Social History: Former Smoker (2003). Denies illicit drug use and alcohol use.      Previous Cardiac Diagnostics:   ECHO (8.25.23):   Left Ventricle: Mildly increased wall thickness. Normal wall motion. There is normal systolic function with a visually estimated ejection fraction of 65 - 70%. Right Ventricle: Normal right ventricular cavity size. Systolic function is normal. Aortic Valve: The aortic valve is a trileaflet valve. There is mild stenosis. Aortic valve area by VTI is 1.40 cm². Aortic valve peak  velocity is 2.18 m/s. Mean gradient is 9 mmHg. The dimensionless index is 0.49. There is no significant regurgitation.  Mitral Valve: Mildly thickened leaflets. Mildly calcified leaflets. There is no stenosis. The mean pressure gradient across the mitral valve is 4 mmHg at a heart rate of  bpm. There is mild regurgitation. Tricuspid Valve: There is mild regurgitation. IVC/SVC: Intermediate venous pressure at 8 mmHg.     ETT (3.30.23):  Stress EKG is abnormal. Nugent treadmill score is -2, this is suggestive of moderate risk treadmill test. Horizontal ST depression is noted in the Lead 2, Lead 3, AVF, V3, V4, V5 and V6. Maximal exercise treadmill test (MPHR : 86 %).The functional capacity is fair (5.0 METs).The heart rate recovery is normal. The study quality is good.      Holter Monitor (3.17.23):  This is an average quality study. Predominant rhythm is sinus bradycardia. The minimum heart rate recorded was 38 beats / minute (3/17/2023). The maximum heart rate is 96 beats / minute (3/17/2023). The mean heart rate is 43 beats / minute. Second degree AV block noted. 1st degree AV block with rare second degree Type 1 AV block during awake hours. Rare premature ventricular contractions noted.      Carotid US (11.30.22):  The study quality is average. 1-39% stenosis in the proximal right internal carotid artery based on Bluth Criteria. The right vertebral artery is poorly visualized. 1-39% stenosis in the proximal left internal carotid artery based on Bluth Criteria. Antegrade left vertebral artery flow.      TTE (11.30.22):  The study quality is average. The left ventricle is normal in size. Global left ventricular systolic function is normal. The left ventricular ejection fraction is 60%. Left ventricular diastolic function is normal. A prosthetic valve is noted in the aortic position it is a known TAVR 29mm S3 valve date of implant 17/7/2021. The trans-aortic mean gradient is 9.9 mmHg.     TAVR (12.7.23):  Successful  transcatheter valve replacement 29 mm S3 valve     [facility] (11.8.21):  Left main coronary artery: Short. Patent.  Left anterior descending artery: Luminal irregularities  Left circumflex: Dominant. Luminal regularities.  Right coronary artery: Unable to cannulate.     MPI (11.22.19):  This is an abnormal perfusion study. Study is consistent with ischemia.This scan is suggestive of low to moderate risk for future cardiovascular events. Small reversible perfusion abnormality of mild intensity in the anterior lateral region. The left ventricular cavity is noted to be normal on the stress study. The left ventricular ejection fraction was calculated to be 62% and left ventricular global function is normal. The study quality is good    Review of Systems   Cardiovascular:  Negative for chest pain.   Respiratory:  Negative for shortness of breath.        Objective:     Vital Signs (Most Recent):  Temp: 98.2 °F (36.8 °C) (08/26/23 0751)  Pulse: (!) 38 (08/26/23 0751)  Resp: 18 (08/26/23 0500)  BP: (!) 130/53 (08/26/23 0751)  SpO2: 97 % (08/26/23 0751) Vital Signs (24h Range):  Temp:  [97.6 °F (36.4 °C)-98.8 °F (37.1 °C)] 98.2 °F (36.8 °C)  Pulse:  [37-43] 38  Resp:  [16-19] 18  SpO2:  [95 %-97 %] 97 %  BP: ()/(46-92) 130/53     Weight: 107.6 kg (237 lb 3.4 oz)  Body mass index is 37.15 kg/m².    SpO2: 97 %       No intake or output data in the 24 hours ending 08/26/23 1316    Lines/Drains/Airways       Peripheral Intravenous Line  Duration                  Peripheral IV - Single Lumen 08/25/23 1113 20 G Anterior;Left Forearm 1 day                    Significant Labs:   Recent Results (from the past 72 hour(s))   Comprehensive Metabolic Panel    Collection Time: 08/25/23 11:13 AM   Result Value Ref Range    Sodium Level 136 136 - 145 mmol/L    Potassium Level 4.6 3.5 - 5.1 mmol/L    Chloride 105 98 - 107 mmol/L    Carbon Dioxide 20 (L) 23 - 31 mmol/L    Glucose Level 104 82 - 115 mg/dL    Blood Urea Nitrogen 25.8 (H) 8.4 -  25.7 mg/dL    Creatinine 1.19 (H) 0.73 - 1.18 mg/dL    Calcium Level Total 10.6 (H) 8.8 - 10.0 mg/dL    Protein Total 7.5 5.8 - 7.6 gm/dL    Albumin Level 4.0 3.4 - 4.8 g/dL    Globulin 3.5 2.4 - 3.5 gm/dL    Albumin/Globulin Ratio 1.1 1.1 - 2.0 ratio    Bilirubin Total 1.1 <=1.5 mg/dL    Alkaline Phosphatase 53 40 - 150 unit/L    Alanine Aminotransferase 20 0 - 55 unit/L    Aspartate Aminotransferase 19 5 - 34 unit/L    eGFR >60 mls/min/1.73/m2   BNP    Collection Time: 08/25/23 11:13 AM   Result Value Ref Range    Natriuretic Peptide 211.3 (H) <=100.0 pg/mL   Troponin I    Collection Time: 08/25/23 11:13 AM   Result Value Ref Range    Troponin-I <0.010 0.000 - 0.045 ng/mL   CBC with Differential    Collection Time: 08/25/23 11:13 AM   Result Value Ref Range    WBC 9.25 4.50 - 11.50 x10(3)/mcL    RBC 3.66 (L) 4.70 - 6.10 x10(6)/mcL    Hgb 11.7 (L) 14.0 - 18.0 g/dL    Hct 32.4 (L) 42.0 - 52.0 %    MCV 88.5 80.0 - 94.0 fL    MCH 32.0 (H) 27.0 - 31.0 pg    MCHC 36.1 (H) 33.0 - 36.0 g/dL    RDW 13.5 11.5 - 17.0 %    Platelet 211 130 - 400 x10(3)/mcL    MPV 9.9 7.4 - 10.4 fL    Neut % 59.3 %    Lymph % 29.2 %    Mono % 9.0 %    Eos % 1.7 %    Basophil % 0.5 %    Lymph # 2.70 0.6 - 4.6 x10(3)/mcL    Neut # 5.48 2.1 - 9.2 x10(3)/mcL    Mono # 0.83 0.1 - 1.3 x10(3)/mcL    Eos # 0.16 0 - 0.9 x10(3)/mcL    Baso # 0.05 <=0.2 x10(3)/mcL    IG# 0.03 0 - 0.04 x10(3)/mcL    IG% 0.3 %    NRBC% 0.0 %   Magnesium    Collection Time: 08/25/23 11:13 AM   Result Value Ref Range    Magnesium Level 1.90 1.60 - 2.60 mg/dL   Protime-INR    Collection Time: 08/25/23 12:45 PM   Result Value Ref Range    PT 13.9 12.5 - 14.5 seconds    INR 1.1 <=1.3   APTT    Collection Time: 08/25/23 12:45 PM   Result Value Ref Range    PTT 26.2 23.2 - 33.7 seconds   Echo    Collection Time: 08/25/23  1:08 PM   Result Value Ref Range    BSA 2.27 m2    Gallegos's Biplane MOD Ejection Fraction 68 %    LVOT stroke volume 61.49 cm3    LVIDd 4.87 3.5 - 6.0 cm    LV  Systolic Volume 34.70 mL    LV Systolic Volume Index 15.8 mL/m2    LVIDs 2.99 2.1 - 4.0 cm    LV Diastolic Volume 111.00 mL    LV Diastolic Volume Index 50.68 mL/m2    IVS 1.26 (A) 0.6 - 1.1 cm    LVOT diameter 1.90 cm    LVOT area 2.8 cm2    FS 39 28 - 44 %    Left Ventricle Relative Wall Thickness 0.49 cm    Posterior Wall 1.20 (A) 0.6 - 1.1 cm    LV mass 232.17 g    LV Mass Index 106 g/m2    MV Peak E Siddhartha 1.79 m/s    TDI LATERAL 0.06 m/s    TDI SEPTAL 0.08 m/s    E/E' ratio 25.57 m/s    MV Peak A Siddhartha 0.40 m/s    TR Max Siddhartha 2.48 m/s    E/A ratio 4.48     E wave deceleration time 306.00 msec    LV SEPTAL E/E' RATIO 22.38 m/s    LV LATERAL E/E' RATIO 29.83 m/s    LVOT peak siddhartha 1.03 m/s    Left Ventricular Outflow Tract Mean Velocity 0.64 cm/s    Left Ventricular Outflow Tract Mean Gradient 2.00 mmHg    LA volume (mod) 55.30 cm3    LA Volume Index (Mod) 25.3 mL/m2    LA size 4.00 cm    TAPSE 2.44 cm    AV mean gradient 9 mmHg    AV peak gradient 19 mmHg    Ao peak siddhartha 2.18 m/s    Ao VTI 43.90 cm    LVOT peak VTI 21.70 cm    AV valve area 1.40 cm²    AV Velocity Ratio 0.47     AV index (prosthetic) 0.49     ROLO by Velocity Ratio 1.34 cm²    MV mean gradient 4 mmHg    MV peak gradient 20 mmHg    MV stenosis pressure 1/2 time 69.00 ms    MV valve area p 1/2 method 3.19 cm2    MV valve area by continuity eq 0.83 cm2    MV VTI 73.9 cm    Triscuspid Valve Regurgitation Peak Gradient 25 mmHg    PV PEAK VELOCITY 1.07 m/s    PV peak gradient 5 mmHg    Mean e' 0.07 m/s    ZLVIDS -3.23     ZLVIDD -4.18     TV resting pulmonary artery pressure 33 mmHg    RV TB RVSP 10 mmHg    Est. RA pres 8 mmHg   POCT glucose    Collection Time: 08/25/23  5:22 PM   Result Value Ref Range    POCT Glucose 73 70 - 110 mg/dL   POCT glucose    Collection Time: 08/25/23  6:50 PM   Result Value Ref Range    POCT Glucose 129 (H) 70 - 110 mg/dL   POCT glucose    Collection Time: 08/25/23  8:51 PM   Result Value Ref Range    POCT Glucose 93 70 - 110  mg/dL     Telemetry:  Complete Heart Block    Physical Exam  Vitals and nursing note reviewed.   Constitutional:       General: He is not in acute distress.     Appearance: Normal appearance. He is not ill-appearing.   HENT:      Head: Normocephalic.      Mouth/Throat:      Mouth: Mucous membranes are moist.      Pharynx: Oropharynx is clear.   Cardiovascular:      Rate and Rhythm: Bradycardia present. Rhythm irregular.      Heart sounds: Normal heart sounds.   Pulmonary:      Effort: Pulmonary effort is normal. No respiratory distress.   Abdominal:      General: There is no distension.      Palpations: Abdomen is soft.      Tenderness: There is no abdominal tenderness. There is no guarding.   Musculoskeletal:         General: Normal range of motion.      Cervical back: Neck supple.   Skin:     General: Skin is warm and dry.   Neurological:      General: No focal deficit present.      Mental Status: He is alert and oriented to person, place, and time. Mental status is at baseline.   Psychiatric:         Mood and Affect: Mood normal.         Behavior: Behavior normal.       Current Inpatient Medications:    Current Facility-Administered Medications:     aspirin EC tablet 81 mg, 81 mg, Oral, Daily, Bernie, Alicia, FNP, 81 mg at 08/26/23 0853    atorvastatin tablet 40 mg, 40 mg, Oral, Daily, Bernie, Alicia, FNP, 40 mg at 08/26/23 0853    atropine injection 0.5 mg, 0.5 mg, Intravenous, Once PRN, Bernie, Alicia, FNP    glipiZIDE tablet 10 mg, 10 mg, Oral, BID WM, Bernie, Alicia, FNP, 10 mg at 08/26/23 0853    metFORMIN tablet 1,000 mg, 1,000 mg, Oral, BID WM, Bernie, Alicia, FNP, 1,000 mg at 08/26/23 0852    VTE Risk Mitigation (From admission, onward)           Ordered     Place sequential compression device  Until discontinued         08/25/23 7233                  Assessment:   Complete Heart Block  --Currently Hemodynamically Stable   VHD  --AS s/p TAVR (12.7.21):Successful transcatheter valve replacement 29 mm S3  valve  Bilateral Carotid Artery Disease  Native CAD  --[facility] (11.8.21): Left main coronary artery: Short. Patent. Left anterior descending artery: Luminal irregularities Left circumflex: Dominant. Luminal regularities. Right coronary artery: Unable to cannulate.  DM II  HTN  Hypersomia  Snoring  No History of GI Bleed   Iodine Allergy    Plan:   Avoid AV/SA Yeison Blocking Agents  Atropine 0.5mg IV PRN for Sustained HR < 40 WITH BP less than 90/60  --if needs more than one dose, please call immediately   Plan for PPM on 8.28.23    BRAIN Simon  Cardiology  [facility] - [facility] Medical Telemetry  08/26/2023   I have seen the patient, reviewed the Nurse Practitioner's note, assessment and plan. I have personally interviewed and examined the patient at bedside and agree with the findings. Medical decision making listed above were done under my guidance.    Physical exam:  Cardiovascular system: regular rhythm, no murmur.  Lungs: CTAB.  Extremities: No leg edema.    Plan:  Pt stable pacemaker monday

## 2023-08-26 NOTE — NURSING
Patient received to room 957 Via wheelchair. Placed in bed and positioned for comfort. Assessment performed and patient placed on tele monitor.

## 2023-08-27 PROCEDURE — 25000003 PHARM REV CODE 250

## 2023-08-27 PROCEDURE — 11000001 HC ACUTE MED/SURG PRIVATE ROOM

## 2023-08-27 PROCEDURE — 21400001 HC TELEMETRY ROOM

## 2023-08-27 RX ADMIN — GLIPIZIDE 10 MG: 10 TABLET ORAL at 08:08

## 2023-08-27 RX ADMIN — METFORMIN HYDROCHLORIDE 1000 MG: 500 TABLET, FILM COATED ORAL at 05:08

## 2023-08-27 RX ADMIN — ATORVASTATIN CALCIUM 40 MG: 40 TABLET, FILM COATED ORAL at 08:08

## 2023-08-27 RX ADMIN — ASPIRIN 81 MG: 81 TABLET, COATED ORAL at 08:08

## 2023-08-27 RX ADMIN — METFORMIN HYDROCHLORIDE 1000 MG: 500 TABLET, FILM COATED ORAL at 08:08

## 2023-08-27 NOTE — PROGRESS NOTES
[facility] - [facility] Telemetry    Cardiology  Progress Note    Patient Name: Kendall Conroy Sr.  MRN: [id number]  Admission Date: 8/25/2023  Hospital Length of Stay: 2 days  Code Status: No Order   Attending Physician: Oswald Christine MD   Primary Care Physician: Montserrat Taylor FNP  Expected Discharge Date:   Principal Problem:<principal problem not specified>    Subjective:   Chief Complaint:  Shortness of Breath     HPI:  Mr. Conroy is a 71 y/o male with a history of Bradycardia, AS s/p TAVR, Bilateral Carotid Artery Disease, Native CAD, DM II, HTN, who is known to [facility], Dr. Vallejo. He presented to the ER on 8.25.23 with complaints of SOB. He reports he went to his PCP office for the SOB and they reported his heart rate was low. He was then instructed to report to the ER. Upon Arrival to the ER, he was found to be bradycardic with a heart rate in the 40s. EKG revealed Complete Heart Block. Significant labs include H&H 11.7/32.4, BUN/Creat 25.8/1.119, Calcium 10.6, .3. CXR unremarkable. [facility] will admit for Complete Heart Block Management.    Hospital Course:   8.26.23: NAD Noted. Remains in Complete Heart Block. BP Stable. Planning for device on Monday.  8.27.23: NAD Noted. Vitals Stable. BP Stable.      PMH: Bradycardia, VHD, Bilateral Carotid Artery Disease, Native CAD, DM II, HTN, Hypersomia, Snoring  PSH: TAVR, Carpal Tunnel Repair, Neck Surgery, Back Surgery  Family History: Father - HTN; Mother - Encephalitis  Social History: Former Smoker (2003). Denies illicit drug use and alcohol use.      Previous Cardiac Diagnostics:   ECHO (8.25.23):   Left Ventricle: Mildly increased wall thickness. Normal wall motion. There is normal systolic function with a visually estimated ejection fraction of 65 - 70%. Right Ventricle: Normal right ventricular cavity size. Systolic function is normal. Aortic Valve: The aortic valve is a trileaflet valve. There is mild stenosis. Aortic  valve area by VTI is 1.40 cm². Aortic valve peak velocity is 2.18 m/s. Mean gradient is 9 mmHg. The dimensionless index is 0.49. There is no significant regurgitation.  Mitral Valve: Mildly thickened leaflets. Mildly calcified leaflets. There is no stenosis. The mean pressure gradient across the mitral valve is 4 mmHg at a heart rate of  bpm. There is mild regurgitation. Tricuspid Valve: There is mild regurgitation. IVC/SVC: Intermediate venous pressure at 8 mmHg.     ETT (3.30.23):  Stress EKG is abnormal. Nugent treadmill score is -2, this is suggestive of moderate risk treadmill test. Horizontal ST depression is noted in the Lead 2, Lead 3, AVF, V3, V4, V5 and V6. Maximal exercise treadmill test (MPHR : 86 %).The functional capacity is fair (5.0 METs).The heart rate recovery is normal. The study quality is good.      Holter Monitor (3.17.23):  This is an average quality study. Predominant rhythm is sinus bradycardia. The minimum heart rate recorded was 38 beats / minute (3/17/2023). The maximum heart rate is 96 beats / minute (3/17/2023). The mean heart rate is 43 beats / minute. Second degree AV block noted. 1st degree AV block with rare second degree Type 1 AV block during awake hours. Rare premature ventricular contractions noted.      Carotid US (11.30.22):  The study quality is average. 1-39% stenosis in the proximal right internal carotid artery based on Bluth Criteria. The right vertebral artery is poorly visualized. 1-39% stenosis in the proximal left internal carotid artery based on Bluth Criteria. Antegrade left vertebral artery flow.      TTE (11.30.22):  The study quality is average. The left ventricle is normal in size. Global left ventricular systolic function is normal. The left ventricular ejection fraction is 60%. Left ventricular diastolic function is normal. A prosthetic valve is noted in the aortic position it is a known TAVR 29mm S3 valve date of implant 17/7/2021. The trans-aortic mean  gradient is 9.9 mmHg.     TAVR (12.7.23):  Successful transcatheter valve replacement 29 mm S3 valve     LHC (11.8.21):  Left main coronary artery: Short. Patent.  Left anterior descending artery: Luminal irregularities  Left circumflex: Dominant. Luminal regularities.  Right coronary artery: Unable to cannulate.     MPI (11.22.19):  This is an abnormal perfusion study. Study is consistent with ischemia.This scan is suggestive of low to moderate risk for future cardiovascular events. Small reversible perfusion abnormality of mild intensity in the anterior lateral region. The left ventricular cavity is noted to be normal on the stress study. The left ventricular ejection fraction was calculated to be 62% and left ventricular global function is normal. The study quality is good    Review of Systems   Cardiovascular:  Negative for chest pain.   Respiratory:  Negative for shortness of breath.        Objective:     Vital Signs (Most Recent):  Temp: 98.5 °F (36.9 °C) (08/27/23 0734)  Pulse: (!) 37 (08/27/23 0734)  Resp: 18 (08/27/23 0734)  BP: (!) 115/57 (08/27/23 0734)  SpO2: 96 % (08/27/23 0734) Vital Signs (24h Range):  Temp:  [97.4 °F (36.3 °C)-99.1 °F (37.3 °C)] 98.5 °F (36.9 °C)  Pulse:  [37-41] 37  Resp:  [18] 18  SpO2:  [95 %-98 %] 96 %  BP: (115-140)/(57-72) 115/57     Weight: 107.6 kg (237 lb 3.4 oz)  Body mass index is 37.15 kg/m².    SpO2: 96 %         Intake/Output Summary (Last 24 hours) at 8/27/2023 1003  Last data filed at 8/26/2023 1400  Gross per 24 hour   Intake 480 ml   Output --   Net 480 ml       Lines/Drains/Airways       Peripheral Intravenous Line  Duration                  Peripheral IV - Single Lumen 08/25/23 1113 20 G Anterior;Left Forearm 1 day                    Significant Labs:   Recent Results (from the past 72 hour(s))   Comprehensive Metabolic Panel    Collection Time: 08/25/23 11:13 AM   Result Value Ref Range    Sodium Level 136 136 - 145 mmol/L    Potassium Level 4.6 3.5 - 5.1 mmol/L     Chloride 105 98 - 107 mmol/L    Carbon Dioxide 20 (L) 23 - 31 mmol/L    Glucose Level 104 82 - 115 mg/dL    Blood Urea Nitrogen 25.8 (H) 8.4 - 25.7 mg/dL    Creatinine 1.19 (H) 0.73 - 1.18 mg/dL    Calcium Level Total 10.6 (H) 8.8 - 10.0 mg/dL    Protein Total 7.5 5.8 - 7.6 gm/dL    Albumin Level 4.0 3.4 - 4.8 g/dL    Globulin 3.5 2.4 - 3.5 gm/dL    Albumin/Globulin Ratio 1.1 1.1 - 2.0 ratio    Bilirubin Total 1.1 <=1.5 mg/dL    Alkaline Phosphatase 53 40 - 150 unit/L    Alanine Aminotransferase 20 0 - 55 unit/L    Aspartate Aminotransferase 19 5 - 34 unit/L    eGFR >60 mls/min/1.73/m2   BNP    Collection Time: 08/25/23 11:13 AM   Result Value Ref Range    Natriuretic Peptide 211.3 (H) <=100.0 pg/mL   Troponin I    Collection Time: 08/25/23 11:13 AM   Result Value Ref Range    Troponin-I <0.010 0.000 - 0.045 ng/mL   CBC with Differential    Collection Time: 08/25/23 11:13 AM   Result Value Ref Range    WBC 9.25 4.50 - 11.50 x10(3)/mcL    RBC 3.66 (L) 4.70 - 6.10 x10(6)/mcL    Hgb 11.7 (L) 14.0 - 18.0 g/dL    Hct 32.4 (L) 42.0 - 52.0 %    MCV 88.5 80.0 - 94.0 fL    MCH 32.0 (H) 27.0 - 31.0 pg    MCHC 36.1 (H) 33.0 - 36.0 g/dL    RDW 13.5 11.5 - 17.0 %    Platelet 211 130 - 400 x10(3)/mcL    MPV 9.9 7.4 - 10.4 fL    Neut % 59.3 %    Lymph % 29.2 %    Mono % 9.0 %    Eos % 1.7 %    Basophil % 0.5 %    Lymph # 2.70 0.6 - 4.6 x10(3)/mcL    Neut # 5.48 2.1 - 9.2 x10(3)/mcL    Mono # 0.83 0.1 - 1.3 x10(3)/mcL    Eos # 0.16 0 - 0.9 x10(3)/mcL    Baso # 0.05 <=0.2 x10(3)/mcL    IG# 0.03 0 - 0.04 x10(3)/mcL    IG% 0.3 %    NRBC% 0.0 %   Magnesium    Collection Time: 08/25/23 11:13 AM   Result Value Ref Range    Magnesium Level 1.90 1.60 - 2.60 mg/dL   Protime-INR    Collection Time: 08/25/23 12:45 PM   Result Value Ref Range    PT 13.9 12.5 - 14.5 seconds    INR 1.1 <=1.3   APTT    Collection Time: 08/25/23 12:45 PM   Result Value Ref Range    PTT 26.2 23.2 - 33.7 seconds   Echo    Collection Time: 08/25/23  1:08 PM    Result Value Ref Range    BSA 2.27 m2    Gallegos's Biplane MOD Ejection Fraction 68 %    LVOT stroke volume 61.49 cm3    LVIDd 4.87 3.5 - 6.0 cm    LV Systolic Volume 34.70 mL    LV Systolic Volume Index 15.8 mL/m2    LVIDs 2.99 2.1 - 4.0 cm    LV Diastolic Volume 111.00 mL    LV Diastolic Volume Index 50.68 mL/m2    IVS 1.26 (A) 0.6 - 1.1 cm    LVOT diameter 1.90 cm    LVOT area 2.8 cm2    FS 39 28 - 44 %    Left Ventricle Relative Wall Thickness 0.49 cm    Posterior Wall 1.20 (A) 0.6 - 1.1 cm    LV mass 232.17 g    LV Mass Index 106 g/m2    MV Peak E Siddhartha 1.79 m/s    TDI LATERAL 0.06 m/s    TDI SEPTAL 0.08 m/s    E/E' ratio 25.57 m/s    MV Peak A Siddhartha 0.40 m/s    TR Max Siddhartha 2.48 m/s    E/A ratio 4.48     E wave deceleration time 306.00 msec    LV SEPTAL E/E' RATIO 22.38 m/s    LV LATERAL E/E' RATIO 29.83 m/s    LVOT peak siddhartha 1.03 m/s    Left Ventricular Outflow Tract Mean Velocity 0.64 cm/s    Left Ventricular Outflow Tract Mean Gradient 2.00 mmHg    LA volume (mod) 55.30 cm3    LA Volume Index (Mod) 25.3 mL/m2    LA size 4.00 cm    TAPSE 2.44 cm    AV mean gradient 9 mmHg    AV peak gradient 19 mmHg    Ao peak siddhartha 2.18 m/s    Ao VTI 43.90 cm    LVOT peak VTI 21.70 cm    AV valve area 1.40 cm²    AV Velocity Ratio 0.47     AV index (prosthetic) 0.49     ROLO by Velocity Ratio 1.34 cm²    MV mean gradient 4 mmHg    MV peak gradient 20 mmHg    MV stenosis pressure 1/2 time 69.00 ms    MV valve area p 1/2 method 3.19 cm2    MV valve area by continuity eq 0.83 cm2    MV VTI 73.9 cm    Triscuspid Valve Regurgitation Peak Gradient 25 mmHg    PV PEAK VELOCITY 1.07 m/s    PV peak gradient 5 mmHg    Mean e' 0.07 m/s    ZLVIDS -3.23     ZLVIDD -4.18     TV resting pulmonary artery pressure 33 mmHg    RV TB RVSP 10 mmHg    Est. RA pres 8 mmHg   POCT glucose    Collection Time: 08/25/23  5:22 PM   Result Value Ref Range    POCT Glucose 73 70 - 110 mg/dL   POCT glucose    Collection Time: 08/25/23  6:50 PM   Result Value Ref  Range    POCT Glucose 129 (H) 70 - 110 mg/dL   POCT glucose    Collection Time: 08/25/23  8:51 PM   Result Value Ref Range    POCT Glucose 93 70 - 110 mg/dL     Telemetry:  Complete Heart Block    Physical Exam  Vitals and nursing note reviewed.   Constitutional:       General: He is not in acute distress.     Appearance: Normal appearance. He is not ill-appearing.   HENT:      Head: Normocephalic.      Mouth/Throat:      Mouth: Mucous membranes are moist.      Pharynx: Oropharynx is clear.   Cardiovascular:      Rate and Rhythm: Bradycardia present. Rhythm irregular.      Heart sounds: Normal heart sounds.   Pulmonary:      Effort: Pulmonary effort is normal. No respiratory distress.   Abdominal:      General: There is no distension.      Palpations: Abdomen is soft.      Tenderness: There is no abdominal tenderness. There is no guarding.   Musculoskeletal:         General: Normal range of motion.      Cervical back: Neck supple.   Skin:     General: Skin is warm and dry.   Neurological:      General: No focal deficit present.      Mental Status: He is alert and oriented to person, place, and time. Mental status is at baseline.   Psychiatric:         Mood and Affect: Mood normal.         Behavior: Behavior normal.       Current Inpatient Medications:    Current Facility-Administered Medications:     aspirin EC tablet 81 mg, 81 mg, Oral, Daily, Bernie, Alicia, FNP, 81 mg at 08/27/23 0825    atorvastatin tablet 40 mg, 40 mg, Oral, Daily, Bernie, Alicia, FNP, 40 mg at 08/27/23 0825    atropine injection 0.5 mg, 0.5 mg, Intravenous, Once PRN, Bernie, Alicia, FNP    glipiZIDE tablet 10 mg, 10 mg, Oral, BID WM, Bernie, Alicia, FNP, 10 mg at 08/27/23 0825    metFORMIN tablet 1,000 mg, 1,000 mg, Oral, BID WM, Bernie, Alicia, FNP, 1,000 mg at 08/27/23 0825    VTE Risk Mitigation (From admission, onward)           Ordered     Place sequential compression device  Until discontinued         08/25/23 1073                   Assessment:   Complete Heart Block  --Currently Hemodynamically Stable   VHD  --AS s/p TAVR (12.7.21):Successful transcatheter valve replacement 29 mm S3 valve  Bilateral Carotid Artery Disease  Native CAD  --LHC (11.8.21): Left main coronary artery: Short. Patent. Left anterior descending artery: Luminal irregularities Left circumflex: Dominant. Luminal regularities. Right coronary artery: Unable to cannulate.  DM II  HTN  Hypersomia  Snoring  No History of GI Bleed   Iodine Allergy    Plan:   Avoid AV/SA Yeison Blocking Agents  Atropine PRN  NPO Past Midnight  EP Consult for Device Placement Evaluation Tomorrow    BRAIN Simon  Cardiology  [facility] - [facility] Tele[facility]  08/27/2023     I have seen the patient, reviewed the Nurse Practitioner's note, assessment and plan. I have personally interviewed and examined the patient at bedside and agree with the findings. Medical decision making listed above were done under my guidance.    Physical exam:  Cardiovascular system: regular rhythm, no murmur.  Lungs: CTAB.  Extremities: No leg edema.    Plan:  Pacemaker in a.m.

## 2023-08-28 VITALS
TEMPERATURE: 98 F | WEIGHT: 237.19 LBS | DIASTOLIC BLOOD PRESSURE: 76 MMHG | OXYGEN SATURATION: 93 % | RESPIRATION RATE: 18 BRPM | HEIGHT: 67 IN | HEART RATE: 80 BPM | BODY MASS INDEX: 37.23 KG/M2 | SYSTOLIC BLOOD PRESSURE: 133 MMHG

## 2023-08-28 PROBLEM — I44.2 COMPLETE HEART BLOCK: Status: ACTIVE | Noted: 2023-08-28

## 2023-08-28 LAB
ALBUMIN SERPL-MCNC: 3.9 G/DL (ref 3.4–4.8)
ALBUMIN/GLOB SERPL: 1.3 RATIO (ref 1.1–2)
ALP SERPL-CCNC: 66 UNIT/L (ref 40–150)
ALT SERPL-CCNC: 19 UNIT/L (ref 0–55)
AST SERPL-CCNC: 18 UNIT/L (ref 5–34)
BASOPHILS # BLD AUTO: 0.05 X10(3)/MCL
BASOPHILS NFR BLD AUTO: 0.5 %
BILIRUB SERPL-MCNC: 0.9 MG/DL
BUN SERPL-MCNC: 28.4 MG/DL (ref 8.4–25.7)
CALCIUM SERPL-MCNC: 10 MG/DL (ref 8.8–10)
CHLORIDE SERPL-SCNC: 107 MMOL/L (ref 98–107)
CO2 SERPL-SCNC: 20 MMOL/L (ref 23–31)
CREAT SERPL-MCNC: 1.13 MG/DL (ref 0.73–1.18)
EOSINOPHIL # BLD AUTO: 0.22 X10(3)/MCL (ref 0–0.9)
EOSINOPHIL NFR BLD AUTO: 2.1 %
ERYTHROCYTE [DISTWIDTH] IN BLOOD BY AUTOMATED COUNT: 13.6 % (ref 11.5–17)
GFR SERPLBLD CREATININE-BSD FMLA CKD-EPI: >60 MLS/MIN/1.73/M2
GLOBULIN SER-MCNC: 3.1 GM/DL (ref 2.4–3.5)
GLUCOSE SERPL-MCNC: 155 MG/DL (ref 82–115)
HCT VFR BLD AUTO: 35.1 % (ref 42–52)
HGB BLD-MCNC: 12.2 G/DL (ref 14–18)
IMM GRANULOCYTES # BLD AUTO: 0.03 X10(3)/MCL (ref 0–0.04)
IMM GRANULOCYTES NFR BLD AUTO: 0.3 %
LYMPHOCYTES # BLD AUTO: 3.35 X10(3)/MCL (ref 0.6–4.6)
LYMPHOCYTES NFR BLD AUTO: 31.3 %
MAGNESIUM SERPL-MCNC: 1.5 MG/DL (ref 1.6–2.6)
MCH RBC QN AUTO: 31.7 PG (ref 27–31)
MCHC RBC AUTO-ENTMCNC: 34.8 G/DL (ref 33–36)
MCV RBC AUTO: 91.2 FL (ref 80–94)
MONOCYTES # BLD AUTO: 0.94 X10(3)/MCL (ref 0.1–1.3)
MONOCYTES NFR BLD AUTO: 8.8 %
NEUTROPHILS # BLD AUTO: 6.12 X10(3)/MCL (ref 2.1–9.2)
NEUTROPHILS NFR BLD AUTO: 57 %
NRBC BLD AUTO-RTO: 0 %
PLATELET # BLD AUTO: 229 X10(3)/MCL (ref 130–400)
PMV BLD AUTO: 9.8 FL (ref 7.4–10.4)
POTASSIUM SERPL-SCNC: 4.6 MMOL/L (ref 3.5–5.1)
PROT SERPL-MCNC: 7 GM/DL (ref 5.8–7.6)
RBC # BLD AUTO: 3.85 X10(6)/MCL (ref 4.7–6.1)
SODIUM SERPL-SCNC: 137 MMOL/L (ref 136–145)
WBC # SPEC AUTO: 10.71 X10(3)/MCL (ref 4.5–11.5)

## 2023-08-28 PROCEDURE — 99153 MOD SED SAME PHYS/QHP EA: CPT | Performed by: INTERNAL MEDICINE

## 2023-08-28 PROCEDURE — 93010 ELECTROCARDIOGRAM REPORT: CPT | Mod: 76,,, | Performed by: STUDENT IN AN ORGANIZED HEALTH CARE EDUCATION/TRAINING PROGRAM

## 2023-08-28 PROCEDURE — 93010 EKG 12-LEAD: ICD-10-PCS | Mod: 76,,, | Performed by: STUDENT IN AN ORGANIZED HEALTH CARE EDUCATION/TRAINING PROGRAM

## 2023-08-28 PROCEDURE — 80053 COMPREHEN METABOLIC PANEL: CPT | Performed by: NURSE PRACTITIONER

## 2023-08-28 PROCEDURE — C1785 PMKR, DUAL, RATE-RESP: HCPCS | Performed by: INTERNAL MEDICINE

## 2023-08-28 PROCEDURE — C1898 LEAD, PMKR, OTHER THAN TRANS: HCPCS | Performed by: INTERNAL MEDICINE

## 2023-08-28 PROCEDURE — 85025 COMPLETE CBC W/AUTO DIFF WBC: CPT | Performed by: NURSE PRACTITIONER

## 2023-08-28 PROCEDURE — 93005 ELECTROCARDIOGRAM TRACING: CPT

## 2023-08-28 PROCEDURE — 99152 MOD SED SAME PHYS/QHP 5/>YRS: CPT | Performed by: INTERNAL MEDICINE

## 2023-08-28 PROCEDURE — 63600175 PHARM REV CODE 636 W HCPCS

## 2023-08-28 PROCEDURE — 25000003 PHARM REV CODE 250: Performed by: INTERNAL MEDICINE

## 2023-08-28 PROCEDURE — 83735 ASSAY OF MAGNESIUM: CPT | Performed by: NURSE PRACTITIONER

## 2023-08-28 PROCEDURE — 33208 INSRT HEART PM ATRIAL & VENT: CPT | Mod: KX | Performed by: INTERNAL MEDICINE

## 2023-08-28 PROCEDURE — 93010 ELECTROCARDIOGRAM REPORT: CPT | Mod: ,,, | Performed by: STUDENT IN AN ORGANIZED HEALTH CARE EDUCATION/TRAINING PROGRAM

## 2023-08-28 PROCEDURE — 63600175 PHARM REV CODE 636 W HCPCS: Performed by: INTERNAL MEDICINE

## 2023-08-28 DEVICE — PULSE GENERATOR
Type: IMPLANTABLE DEVICE | Site: HEART | Status: FUNCTIONAL
Brand: ASSURITY MRI™

## 2023-08-28 DEVICE — LEAD TENDRIL STS 58CM: Type: IMPLANTABLE DEVICE | Site: HEART | Status: FUNCTIONAL

## 2023-08-28 DEVICE — PACING LEAD
Type: IMPLANTABLE DEVICE | Site: HEART | Status: FUNCTIONAL
Brand: TENDRIL™

## 2023-08-28 RX ORDER — MIDAZOLAM HYDROCHLORIDE 1 MG/ML
INJECTION INTRAMUSCULAR; INTRAVENOUS
Status: DISCONTINUED | OUTPATIENT
Start: 2023-08-28 | End: 2023-08-28 | Stop reason: HOSPADM

## 2023-08-28 RX ORDER — VANCOMYCIN HYDROCHLORIDE 1 G/20ML
INJECTION, POWDER, LYOPHILIZED, FOR SOLUTION INTRAVENOUS
Status: DISCONTINUED | OUTPATIENT
Start: 2023-08-28 | End: 2023-08-28 | Stop reason: HOSPADM

## 2023-08-28 RX ORDER — LIDOCAINE HYDROCHLORIDE 10 MG/ML
INJECTION INFILTRATION; PERINEURAL
Status: DISCONTINUED | OUTPATIENT
Start: 2023-08-28 | End: 2023-08-28 | Stop reason: HOSPADM

## 2023-08-28 RX ORDER — HYDROCODONE BITARTRATE AND ACETAMINOPHEN 5; 325 MG/1; MG/1
1 TABLET ORAL EVERY 4 HOURS PRN
Status: DISCONTINUED | OUTPATIENT
Start: 2023-08-28 | End: 2023-08-28 | Stop reason: HOSPADM

## 2023-08-28 RX ORDER — MAGNESIUM SULFATE HEPTAHYDRATE 40 MG/ML
4 INJECTION, SOLUTION INTRAVENOUS ONCE
Status: COMPLETED | OUTPATIENT
Start: 2023-08-28 | End: 2023-08-28

## 2023-08-28 RX ORDER — ACETAMINOPHEN 325 MG/1
650 TABLET ORAL EVERY 4 HOURS PRN
Status: DISCONTINUED | OUTPATIENT
Start: 2023-08-28 | End: 2023-08-28 | Stop reason: HOSPADM

## 2023-08-28 RX ORDER — FENTANYL CITRATE 50 UG/ML
INJECTION, SOLUTION INTRAMUSCULAR; INTRAVENOUS
Status: DISCONTINUED | OUTPATIENT
Start: 2023-08-28 | End: 2023-08-28 | Stop reason: HOSPADM

## 2023-08-28 RX ADMIN — MAGNESIUM SULFATE 4 G: 2 INJECTION INTRAVENOUS at 10:08

## 2023-08-28 NOTE — NURSING
Discharge instructions and follow up appts reviewed with pt and family.  Pacemaker care and instructions reviewed with verbalized understanding.  IV removed.  CXR showed no pneumothorax so pt d/c per MD order and verified with NP via phone.  Tele removed. No further questions.  Pt and family in room awaiting transport downstairs to go home with family.  Copy of hard script placed in chart.

## 2023-08-28 NOTE — CONSULTS
Consults    [facility] - [facility] Medical Telemetry    Cardiology  EP-Consult Note    Patient Name: Kendall Conroy Sr.  MRN: [id number]  Admission Date: 8/25/2023  Hospital Length of Stay: 3 days  Code Status: No Order   Attending Provider: Oswald Christine MD   Consulting Provider: BRAIN Renae  Primary Care Physician: Montserrat Taylor FNP  Principal Problem:<principal problem not specified>    Patient information was obtained from patient, past medical records, and ER records.     Subjective:     Chief Complaint: Reason for Consult: PPM Evaluation    HPI:   Mr. Conroy is a 73 y/o male with a history of Bradycardia, AS s/p TAVR, Bilateral Carotid Artery Disease, Native CAD, DM II, HTN, who is known to [facility], Dr. Vallejo. He presented to the ER on 8.25.23 with complaints of SOB. He reports he went to his PCP office for the SOB and they reported his heart rate was low. He was then instructed to report to the ER. Upon Arrival to the ER, he was found to be bradycardic with a heart rate in the 40s. EKG revealed Complete Heart Block. Significant labs include H&H 11.7/32.4, BUN/Creat 25.8/1.119, Calcium 10.6, .3. CXR unremarkable. [facility] will admit for Complete Heart Block Management.     Hospital Course:   8.26.23: NAD Noted. Remains in Complete Heart Block. BP Stable. Planning for device on Monday.  8.27.23: NAD Noted. Vitals Stable. BP Stable.   8.28.23: NAD. VSS. No complaints of CP/Palps. Reports SOB. NPO for PPM implant    PMH: Bradycardia, VHD, Bilateral Carotid Artery Disease, Native CAD, DM II, HTN, Hypersomia, Snoring  PSH: TAVR, Carpal Tunnel Repair, Neck Surgery, Back Surgery  Family History: Father - HTN; Mother - Encephalitis  Social History: Former Smoker (2003). Denies illicit drug use and alcohol use.      Previous Cardiac Diagnostics:   ECHO (8.25.23):   Left Ventricle: Mildly increased wall thickness. Normal wall motion. There is normal systolic function with a visually  estimated ejection fraction of 65 - 70%. Right Ventricle: Normal right ventricular cavity size. Systolic function is normal. Aortic Valve: The aortic valve is a trileaflet valve. There is mild stenosis. Aortic valve area by VTI is 1.40 cm². Aortic valve peak velocity is 2.18 m/s. Mean gradient is 9 mmHg. The dimensionless index is 0.49. There is no significant regurgitation.  Mitral Valve: Mildly thickened leaflets. Mildly calcified leaflets. There is no stenosis. The mean pressure gradient across the mitral valve is 4 mmHg at a heart rate of  bpm. There is mild regurgitation. Tricuspid Valve: There is mild regurgitation. IVC/SVC: Intermediate venous pressure at 8 mmHg.     ETT (3.30.23):  Stress EKG is abnormal. Nugent treadmill score is -2, this is suggestive of moderate risk treadmill test. Horizontal ST depression is noted in the Lead 2, Lead 3, AVF, V3, V4, V5 and V6. Maximal exercise treadmill test (MPHR : 86 %).The functional capacity is fair (5.0 METs).The heart rate recovery is normal. The study quality is good.      Holter Monitor (3.17.23):  This is an average quality study. Predominant rhythm is sinus bradycardia. The minimum heart rate recorded was 38 beats / minute (3/17/2023). The maximum heart rate is 96 beats / minute (3/17/2023). The mean heart rate is 43 beats / minute. Second degree AV block noted. 1st degree AV block with rare second degree Type 1 AV block during awake hours. Rare premature ventricular contractions noted.      Carotid US (11.30.22):  The study quality is average. 1-39% stenosis in the proximal right internal carotid artery based on Bluth Criteria. The right vertebral artery is poorly visualized. 1-39% stenosis in the proximal left internal carotid artery based on Bluth Criteria. Antegrade left vertebral artery flow.      TTE (11.30.22):  The study quality is average. The left ventricle is normal in size. Global left ventricular systolic function is normal. The left ventricular  ejection fraction is 60%. Left ventricular diastolic function is normal. A prosthetic valve is noted in the aortic position it is a known TAVR 29mm S3 valve date of implant 17/7/2021. The trans-aortic mean gradient is 9.9 mmHg.     TAVR (12.7.23):  Successful transcatheter valve replacement 29 mm S3 valve     LHC (11.8.21):  Left main coronary artery: Short. Patent.  Left anterior descending artery: Luminal irregularities  Left circumflex: Dominant. Luminal regularities.  Right coronary artery: Unable to cannulate.     MPI (11.22.19):  This is an abnormal perfusion study. Study is consistent with ischemia.This scan is suggestive of low to moderate risk for future cardiovascular events. Small reversible perfusion abnormality of mild intensity in the anterior lateral region. The left ventricular cavity is noted to be normal on the stress study. The left ventricular ejection fraction was calculated to be 62% and left ventricular global function is normal. The study quality is good      Review of patient's allergies indicates:   Allergen Reactions    Iodine Anaphylaxis    Gabapentin      Other reaction(s): Elevated liver enzymes    Shrimp      Other reaction(s): Anaphalaxis       No current facility-administered medications on file prior to encounter.     Current Outpatient Medications on File Prior to Encounter   Medication Sig    albuterol (VENTOLIN HFA) 90 mcg/actuation inhaler Inhale 2 puffs into the lungs every 6 (six) hours as needed for Wheezing. Rescue    glipiZIDE (GLUCOTROL) 10 MG tablet Take 10 mg by mouth 2 (two) times daily before meals.    lisinopriL (PRINIVIL,ZESTRIL) 20 MG tablet Take 20 mg by mouth once daily.    lisinopriL-hydrochlorothiazide (PRINZIDE,ZESTORETIC) 20-12.5 mg per tablet Take 1 tablet by mouth once daily.    metFORMIN (GLUCOPHAGE) 1000 MG tablet Take 1,000 mg by mouth 2 (two) times daily with meals.    rosuvastatin (CRESTOR) 40 MG Tab Take 10 mg by mouth every evening.       Review of  Systems   Respiratory:  Positive for shortness of breath. Negative for chest tightness.    Cardiovascular:  Negative for chest pain, palpitations and leg swelling.   All other systems reviewed and are negative.      Objective:     Vital Signs (Most Recent):  Temp: 98 °F (36.7 °C) (08/28/23 0853)  Pulse: (!) 38 (08/28/23 0853)  Resp: 18 (08/28/23 0449)  BP: 116/66 (08/28/23 0853)  SpO2: 97 % (08/28/23 0853) Vital Signs (24h Range):  Temp:  [97.3 °F (36.3 °C)-98.8 °F (37.1 °C)] 98 °F (36.7 °C)  Pulse:  [38-44] 38  Resp:  [18] 18  SpO2:  [96 %-99 %] 97 %  BP: (116-138)/(56-71) 116/66     Weight: 107.6 kg (237 lb 3.4 oz)  Body mass index is 37.15 kg/m².    SpO2: 97 %         Intake/Output Summary (Last 24 hours) at 8/28/2023 0923  Last data filed at 8/27/2023 1400  Gross per 24 hour   Intake 1560 ml   Output --   Net 1560 ml       Lines/Drains/Airways       Peripheral Intravenous Line  Duration                  Peripheral IV - Single Lumen 08/25/23 1113 20 G Anterior;Left Forearm 2 days                    Significant Labs:  Recent Results (from the past 72 hour(s))   Comprehensive Metabolic Panel    Collection Time: 08/25/23 11:13 AM   Result Value Ref Range    Sodium Level 136 136 - 145 mmol/L    Potassium Level 4.6 3.5 - 5.1 mmol/L    Chloride 105 98 - 107 mmol/L    Carbon Dioxide 20 (L) 23 - 31 mmol/L    Glucose Level 104 82 - 115 mg/dL    Blood Urea Nitrogen 25.8 (H) 8.4 - 25.7 mg/dL    Creatinine 1.19 (H) 0.73 - 1.18 mg/dL    Calcium Level Total 10.6 (H) 8.8 - 10.0 mg/dL    Protein Total 7.5 5.8 - 7.6 gm/dL    Albumin Level 4.0 3.4 - 4.8 g/dL    Globulin 3.5 2.4 - 3.5 gm/dL    Albumin/Globulin Ratio 1.1 1.1 - 2.0 ratio    Bilirubin Total 1.1 <=1.5 mg/dL    Alkaline Phosphatase 53 40 - 150 unit/L    Alanine Aminotransferase 20 0 - 55 unit/L    Aspartate Aminotransferase 19 5 - 34 unit/L    eGFR >60 mls/min/1.73/m2   BNP    Collection Time: 08/25/23 11:13 AM   Result Value Ref Range    Natriuretic Peptide 211.3 (H)  <=100.0 pg/mL   Troponin I    Collection Time: 08/25/23 11:13 AM   Result Value Ref Range    Troponin-I <0.010 0.000 - 0.045 ng/mL   CBC with Differential    Collection Time: 08/25/23 11:13 AM   Result Value Ref Range    WBC 9.25 4.50 - 11.50 x10(3)/mcL    RBC 3.66 (L) 4.70 - 6.10 x10(6)/mcL    Hgb 11.7 (L) 14.0 - 18.0 g/dL    Hct 32.4 (L) 42.0 - 52.0 %    MCV 88.5 80.0 - 94.0 fL    MCH 32.0 (H) 27.0 - 31.0 pg    MCHC 36.1 (H) 33.0 - 36.0 g/dL    RDW 13.5 11.5 - 17.0 %    Platelet 211 130 - 400 x10(3)/mcL    MPV 9.9 7.4 - 10.4 fL    Neut % 59.3 %    Lymph % 29.2 %    Mono % 9.0 %    Eos % 1.7 %    Basophil % 0.5 %    Lymph # 2.70 0.6 - 4.6 x10(3)/mcL    Neut # 5.48 2.1 - 9.2 x10(3)/mcL    Mono # 0.83 0.1 - 1.3 x10(3)/mcL    Eos # 0.16 0 - 0.9 x10(3)/mcL    Baso # 0.05 <=0.2 x10(3)/mcL    IG# 0.03 0 - 0.04 x10(3)/mcL    IG% 0.3 %    NRBC% 0.0 %   Magnesium    Collection Time: 08/25/23 11:13 AM   Result Value Ref Range    Magnesium Level 1.90 1.60 - 2.60 mg/dL   Protime-INR    Collection Time: 08/25/23 12:45 PM   Result Value Ref Range    PT 13.9 12.5 - 14.5 seconds    INR 1.1 <=1.3   APTT    Collection Time: 08/25/23 12:45 PM   Result Value Ref Range    PTT 26.2 23.2 - 33.7 seconds   Echo    Collection Time: 08/25/23  1:08 PM   Result Value Ref Range    BSA 2.27 m2    Gallegos's Biplane MOD Ejection Fraction 68 %    LVOT stroke volume 61.49 cm3    LVIDd 4.87 3.5 - 6.0 cm    LV Systolic Volume 34.70 mL    LV Systolic Volume Index 15.8 mL/m2    LVIDs 2.99 2.1 - 4.0 cm    LV Diastolic Volume 111.00 mL    LV Diastolic Volume Index 50.68 mL/m2    IVS 1.26 (A) 0.6 - 1.1 cm    LVOT diameter 1.90 cm    LVOT area 2.8 cm2    FS 39 28 - 44 %    Left Ventricle Relative Wall Thickness 0.49 cm    Posterior Wall 1.20 (A) 0.6 - 1.1 cm    LV mass 232.17 g    LV Mass Index 106 g/m2    MV Peak E Siddhartha 1.79 m/s    TDI LATERAL 0.06 m/s    TDI SEPTAL 0.08 m/s    E/E' ratio 25.57 m/s    MV Peak A Siddhartha 0.40 m/s    TR Max Siddhartha 2.48 m/s    E/A  ratio 4.48     E wave deceleration time 306.00 msec    LV SEPTAL E/E' RATIO 22.38 m/s    LV LATERAL E/E' RATIO 29.83 m/s    LVOT peak jeffrey 1.03 m/s    Left Ventricular Outflow Tract Mean Velocity 0.64 cm/s    Left Ventricular Outflow Tract Mean Gradient 2.00 mmHg    LA volume (mod) 55.30 cm3    LA Volume Index (Mod) 25.3 mL/m2    LA size 4.00 cm    TAPSE 2.44 cm    AV mean gradient 9 mmHg    AV peak gradient 19 mmHg    Ao peak jeffrey 2.18 m/s    Ao VTI 43.90 cm    LVOT peak VTI 21.70 cm    AV valve area 1.40 cm²    AV Velocity Ratio 0.47     AV index (prosthetic) 0.49     ROLO by Velocity Ratio 1.34 cm²    MV mean gradient 4 mmHg    MV peak gradient 20 mmHg    MV stenosis pressure 1/2 time 69.00 ms    MV valve area p 1/2 method 3.19 cm2    MV valve area by continuity eq 0.83 cm2    MV VTI 73.9 cm    Triscuspid Valve Regurgitation Peak Gradient 25 mmHg    PV PEAK VELOCITY 1.07 m/s    PV peak gradient 5 mmHg    Mean e' 0.07 m/s    ZLVIDS -3.23     ZLVIDD -4.18     TV resting pulmonary artery pressure 33 mmHg    RV TB RVSP 10 mmHg    Est. RA pres 8 mmHg   POCT glucose    Collection Time: 08/25/23  5:22 PM   Result Value Ref Range    POCT Glucose 73 70 - 110 mg/dL   POCT glucose    Collection Time: 08/25/23  6:50 PM   Result Value Ref Range    POCT Glucose 129 (H) 70 - 110 mg/dL   POCT glucose    Collection Time: 08/25/23  8:51 PM   Result Value Ref Range    POCT Glucose 93 70 - 110 mg/dL   Comprehensive Metabolic Panel    Collection Time: 08/28/23  5:58 AM   Result Value Ref Range    Sodium Level 137 136 - 145 mmol/L    Potassium Level 4.6 3.5 - 5.1 mmol/L    Chloride 107 98 - 107 mmol/L    Carbon Dioxide 20 (L) 23 - 31 mmol/L    Glucose Level 155 (H) 82 - 115 mg/dL    Blood Urea Nitrogen 28.4 (H) 8.4 - 25.7 mg/dL    Creatinine 1.13 0.73 - 1.18 mg/dL    Calcium Level Total 10.0 8.8 - 10.0 mg/dL    Protein Total 7.0 5.8 - 7.6 gm/dL    Albumin Level 3.9 3.4 - 4.8 g/dL    Globulin 3.1 2.4 - 3.5 gm/dL    Albumin/Globulin Ratio  1.3 1.1 - 2.0 ratio    Bilirubin Total 0.9 <=1.5 mg/dL    Alkaline Phosphatase 66 40 - 150 unit/L    Alanine Aminotransferase 19 0 - 55 unit/L    Aspartate Aminotransferase 18 5 - 34 unit/L    eGFR >60 mls/min/1.73/m2   Magnesium    Collection Time: 08/28/23  5:58 AM   Result Value Ref Range    Magnesium Level 1.50 (L) 1.60 - 2.60 mg/dL   CBC with Differential    Collection Time: 08/28/23  5:58 AM   Result Value Ref Range    WBC 10.71 4.50 - 11.50 x10(3)/mcL    RBC 3.85 (L) 4.70 - 6.10 x10(6)/mcL    Hgb 12.2 (L) 14.0 - 18.0 g/dL    Hct 35.1 (L) 42.0 - 52.0 %    MCV 91.2 80.0 - 94.0 fL    MCH 31.7 (H) 27.0 - 31.0 pg    MCHC 34.8 33.0 - 36.0 g/dL    RDW 13.6 11.5 - 17.0 %    Platelet 229 130 - 400 x10(3)/mcL    MPV 9.8 7.4 - 10.4 fL    Neut % 57.0 %    Lymph % 31.3 %    Mono % 8.8 %    Eos % 2.1 %    Basophil % 0.5 %    Lymph # 3.35 0.6 - 4.6 x10(3)/mcL    Neut # 6.12 2.1 - 9.2 x10(3)/mcL    Mono # 0.94 0.1 - 1.3 x10(3)/mcL    Eos # 0.22 0 - 0.9 x10(3)/mcL    Baso # 0.05 <=0.2 x10(3)/mcL    IG# 0.03 0 - 0.04 x10(3)/mcL    IG% 0.3 %    NRBC% 0.0 %       Significant Imaging:  Imaging Results              X-Ray Chest 1 View (Final result)  Result time 08/25/23 11:25:52      Final result by Don Lilly MD (08/25/23 11:25:52)                   Impression:      NO ACUTE CARDIOPULMONARY PROCESS IDENTIFIED.      Electronically signed by: Don Lilly  Date:    08/25/2023  Time:    11:25               Narrative:    EXAMINATION:  XR CHEST 1 VIEW    CLINICAL HISTORY:  Shortness of breath    TECHNIQUE:  One view    COMPARISON:  December 6, 2021.    FINDINGS:  Cardiopericardial silhouette is within normal limits. Lungs are without dense focal or segmental consolidation, congestive process, pleural effusions or pneumothorax.                                      EKG:        Telemetry:  Complete Heart Block    Physical Exam  Vitals and nursing note reviewed.   Constitutional:       Appearance: Normal appearance.   HENT:       Head: Normocephalic.      Nose: Nose normal.      Mouth/Throat:      Mouth: Mucous membranes are moist.   Eyes:      Pupils: Pupils are equal, round, and reactive to light.   Cardiovascular:      Rate and Rhythm: Bradycardia present. Rhythm irregular.      Pulses: Normal pulses.      Heart sounds: Normal heart sounds.   Pulmonary:      Effort: Pulmonary effort is normal. No respiratory distress.      Breath sounds: Normal breath sounds.   Abdominal:      General: Bowel sounds are normal.      Palpations: Abdomen is soft.   Musculoskeletal:         General: Normal range of motion.      Cervical back: Normal range of motion.   Skin:     General: Skin is warm and dry.      Capillary Refill: Capillary refill takes less than 2 seconds.   Neurological:      Mental Status: He is alert and oriented to person, place, and time.   Psychiatric:         Mood and Affect: Mood normal.         Behavior: Behavior normal.         Thought Content: Thought content normal.         Judgment: Judgment normal.       Home Medications:   No current facility-administered medications on file prior to encounter.     Current Outpatient Medications on File Prior to Encounter   Medication Sig Dispense Refill    albuterol (VENTOLIN HFA) 90 mcg/actuation inhaler Inhale 2 puffs into the lungs every 6 (six) hours as needed for Wheezing. Rescue      glipiZIDE (GLUCOTROL) 10 MG tablet Take 10 mg by mouth 2 (two) times daily before meals.      lisinopriL (PRINIVIL,ZESTRIL) 20 MG tablet Take 20 mg by mouth once daily.      lisinopriL-hydrochlorothiazide (PRINZIDE,ZESTORETIC) 20-12.5 mg per tablet Take 1 tablet by mouth once daily.      metFORMIN (GLUCOPHAGE) 1000 MG tablet Take 1,000 mg by mouth 2 (two) times daily with meals.      rosuvastatin (CRESTOR) 40 MG Tab Take 10 mg by mouth every evening.       Current Inpatient Medications:    Current Facility-Administered Medications:     aspirin EC tablet 81 mg, 81 mg, Oral, Daily, Bernie, Alicia, FNP, 81 mg  at 08/27/23 0825    atorvastatin tablet 40 mg, 40 mg, Oral, Daily, Bernie, Alicia, FNP, 40 mg at 08/27/23 0825    atropine injection 0.5 mg, 0.5 mg, Intravenous, Once PRN, Bernie, Alicia, FNP    glipiZIDE tablet 10 mg, 10 mg, Oral, BID WM, Bernie, Alicia, FNP, 10 mg at 08/27/23 0825    magnesium sulfate 2g in water 50mL IVPB (premix), 4 g, Intravenous, Once, Bernie, Alicia, FNP    metFORMIN tablet 1,000 mg, 1,000 mg, Oral, BID WM, Bernie, Alicia, FNP, 1,000 mg at 08/27/23 1741    VTE Risk Mitigation (From admission, onward)           Ordered     Place sequential compression device  Until discontinued         08/25/23 1655                    I,Jimmy Whaley MD,performed the substantive portion of this visit. I had a face-to-face time with the patient on 8/28/23. I reviewed and agree with the nurse practitioner's history, physical exam.     Medical decision making:     Assessment/Plan:   Complete Heart Block  --Currently Hemodynamically Stable   VHD  --AS s/p TAVR (12.7.21):Successful transcatheter valve replacement 29 mm S3 valve  Bilateral Carotid Artery Disease  Native CAD  --[facility] (11.8.21): Left main coronary artery: Short. Patent. Left anterior descending artery: Luminal irregularities Left circumflex: Dominant. Luminal regularities. Right coronary artery: Unable to cannulate.  DM II  HTN  Hypersomia  Snoring  No History of GI Bleed   Iodine Allergy     Keep NPO for PPM  PPM Implant Today  Risk, Benefits and Alternatives Reviewed and Discussed with the PT and their Family and they wish to proceed with above Procedure.  Consents Obtained (Placed in chart)/Schedule Procedure   Avoid AV/SA Yeison Blocking Agents  Atropine PRN  Labs in AM: CBC, BMP, and Mag     Thank you for your EP Consult.     BRAIN Renae  Cardiology  [facility] - [facility] Tele[facility]  08/28/2023 9:23 AM

## 2023-08-28 NOTE — DISCHARGE SUMMARY
[facility] - Cath Lab Services  Discharge Note  Short Stay    Procedure(s) (LRB):  INSERTION, PACEMAKER (Left)      OUTCOME: Patient tolerated treatment/procedure well without complication and is now ready for discharge.    DISPOSITION: Home or Self Care    FINAL DIAGNOSIS:  Complete heart block    FOLLOWUP: In clinic    DISCHARGE INSTRUCTIONS:    Discharge Procedure Orders   Diet Cardiac     Diet general     Lifting restrictions   Order Comments: Do not raise arm above shoulder height and no more then 15 pounds.     Notify your health care provider if you experience any of the following:  temperature >100.4     Notify your health care provider if you experience any of the following:  redness, tenderness, or signs of infection (pain, swelling, redness, odor or green/yellow discharge around incision site)        TIME SPENT ON DISCHARGE: 15 minutes

## (undated) DEVICE — DRAPE INCISE IOBAN 2 23X23IN

## (undated) DEVICE — SUT VICRYL 2-0 36 CT-1

## (undated) DEVICE — SUT SILK 0 SH 30IN BLK BR

## (undated) DEVICE — NDL HYPO REG 25G X 1 1/2

## (undated) DEVICE — ELECTRODE REM PLYHSV RETURN 9

## (undated) DEVICE — PACK PACEMAKER

## (undated) DEVICE — SUT CTD VICRYL PLUS 4/0

## (undated) DEVICE — ADHESIVE DERMABOND ADVANCED